# Patient Record
Sex: FEMALE | Race: BLACK OR AFRICAN AMERICAN | NOT HISPANIC OR LATINO | Employment: OTHER | URBAN - METROPOLITAN AREA
[De-identification: names, ages, dates, MRNs, and addresses within clinical notes are randomized per-mention and may not be internally consistent; named-entity substitution may affect disease eponyms.]

---

## 2024-01-24 ENCOUNTER — TELEPHONE (OUTPATIENT)
Dept: INTERNAL MEDICINE | Facility: CLINIC | Age: 84
End: 2024-01-24
Payer: MEDICARE

## 2024-01-24 NOTE — TELEPHONE ENCOUNTER
Attempted to contact pt w/ na. Left vm confirming appt. Advised pt to call if unable to attend appt or with any concerns.

## 2024-01-24 NOTE — TELEPHONE ENCOUNTER
----- Message from Adis Espitia MA sent at 1/24/2024  8:36 AM CST -----  Regarding: PV 2/7/24 @ 3:00 Dr. Stokes  1. Are there any outstanding tasks in the patient's chart? Yes, fasting labs    2. Is there any documentation in the chart? No    3.Has patient been seen in an ER, Urgent care clinic, or been admitted since last visit?  If yes, When, where, and why    4. Has patient seen any other healthcare providers since last visit?  If yes, when, where, and why    5. Has patient had any bloodwork or XR done since last visit?    6. Is patient signed up for patient portal?

## 2024-01-29 ENCOUNTER — LAB VISIT (OUTPATIENT)
Dept: LAB | Facility: HOSPITAL | Age: 84
End: 2024-01-29
Attending: INTERNAL MEDICINE
Payer: MEDICARE

## 2024-01-29 DIAGNOSIS — I10 PRIMARY HYPERTENSION: ICD-10-CM

## 2024-01-29 DIAGNOSIS — E78.00 HYPERCHOLESTEROLEMIA: ICD-10-CM

## 2024-01-29 LAB
ALBUMIN SERPL-MCNC: 3.8 G/DL (ref 3.4–4.8)
ALBUMIN/GLOB SERPL: 1.3 RATIO (ref 1.1–2)
ALP SERPL-CCNC: 85 UNIT/L (ref 40–150)
ALT SERPL-CCNC: 9 UNIT/L (ref 0–55)
APPEARANCE UR: CLEAR
AST SERPL-CCNC: 15 UNIT/L (ref 5–34)
BACTERIA #/AREA URNS AUTO: ABNORMAL /HPF
BILIRUB SERPL-MCNC: 0.7 MG/DL
BILIRUB UR QL STRIP.AUTO: NEGATIVE
BUN SERPL-MCNC: 23.6 MG/DL (ref 9.8–20.1)
CALCIUM SERPL-MCNC: 9.5 MG/DL (ref 8.4–10.2)
CHLORIDE SERPL-SCNC: 109 MMOL/L (ref 98–107)
CHOLEST SERPL-MCNC: 221 MG/DL
CHOLEST/HDLC SERPL: 3 {RATIO} (ref 0–5)
CO2 SERPL-SCNC: 26 MMOL/L (ref 23–31)
COLOR UR AUTO: ABNORMAL
CREAT SERPL-MCNC: 0.71 MG/DL (ref 0.55–1.02)
GFR SERPLBLD CREATININE-BSD FMLA CKD-EPI: >60 MLS/MIN/1.73/M2
GLOBULIN SER-MCNC: 2.9 GM/DL (ref 2.4–3.5)
GLUCOSE SERPL-MCNC: 98 MG/DL (ref 82–115)
GLUCOSE UR QL STRIP.AUTO: NORMAL
HDLC SERPL-MCNC: 83 MG/DL (ref 35–60)
KETONES UR QL STRIP.AUTO: NEGATIVE
LDLC SERPL CALC-MCNC: 111 MG/DL (ref 50–140)
LEUKOCYTE ESTERASE UR QL STRIP.AUTO: NEGATIVE
MUCOUS THREADS URNS QL MICRO: ABNORMAL /LPF
NITRITE UR QL STRIP.AUTO: ABNORMAL
PH UR STRIP.AUTO: 6.5 [PH]
POTASSIUM SERPL-SCNC: 3.9 MMOL/L (ref 3.5–5.1)
PROT SERPL-MCNC: 6.7 GM/DL (ref 5.8–7.6)
PROT UR QL STRIP.AUTO: ABNORMAL
RBC #/AREA URNS AUTO: ABNORMAL /HPF
RBC UR QL AUTO: ABNORMAL
SODIUM SERPL-SCNC: 142 MMOL/L (ref 136–145)
SP GR UR STRIP.AUTO: 1.01 (ref 1–1.03)
SQUAMOUS #/AREA URNS LPF: ABNORMAL /HPF
TRIGL SERPL-MCNC: 133 MG/DL (ref 37–140)
UROBILINOGEN UR STRIP-ACNC: NORMAL
VLDLC SERPL CALC-MCNC: 27 MG/DL
WBC #/AREA URNS AUTO: ABNORMAL /HPF

## 2024-01-29 PROCEDURE — 80061 LIPID PANEL: CPT

## 2024-01-29 PROCEDURE — 80053 COMPREHEN METABOLIC PANEL: CPT

## 2024-01-29 PROCEDURE — 36415 COLL VENOUS BLD VENIPUNCTURE: CPT

## 2024-01-29 PROCEDURE — 81001 URINALYSIS AUTO W/SCOPE: CPT

## 2024-02-07 ENCOUNTER — OFFICE VISIT (OUTPATIENT)
Dept: INTERNAL MEDICINE | Facility: CLINIC | Age: 84
End: 2024-02-07
Payer: MEDICARE

## 2024-02-07 VITALS
WEIGHT: 140 LBS | HEIGHT: 60 IN | DIASTOLIC BLOOD PRESSURE: 78 MMHG | TEMPERATURE: 98 F | SYSTOLIC BLOOD PRESSURE: 146 MMHG | BODY MASS INDEX: 27.48 KG/M2 | OXYGEN SATURATION: 97 % | HEART RATE: 84 BPM | RESPIRATION RATE: 16 BRPM

## 2024-02-07 DIAGNOSIS — C90.00 MULTIPLE MYELOMA, REMISSION STATUS UNSPECIFIED: ICD-10-CM

## 2024-02-07 DIAGNOSIS — G89.29 CHRONIC PAIN OF BOTH KNEES: Primary | ICD-10-CM

## 2024-02-07 DIAGNOSIS — M25.561 CHRONIC PAIN OF BOTH KNEES: Primary | ICD-10-CM

## 2024-02-07 DIAGNOSIS — Z23 NEED FOR VACCINATION: ICD-10-CM

## 2024-02-07 DIAGNOSIS — M25.562 CHRONIC PAIN OF BOTH KNEES: Primary | ICD-10-CM

## 2024-02-07 DIAGNOSIS — E78.00 HYPERCHOLESTEROLEMIA: ICD-10-CM

## 2024-02-07 DIAGNOSIS — I10 PRIMARY HYPERTENSION: ICD-10-CM

## 2024-02-07 DIAGNOSIS — R01.1 MURMUR: ICD-10-CM

## 2024-02-07 PROCEDURE — 3077F SYST BP >= 140 MM HG: CPT | Mod: CPTII,,, | Performed by: INTERNAL MEDICINE

## 2024-02-07 PROCEDURE — 99213 OFFICE O/P EST LOW 20 MIN: CPT | Mod: 25,,, | Performed by: INTERNAL MEDICINE

## 2024-02-07 PROCEDURE — 1101F PT FALLS ASSESS-DOCD LE1/YR: CPT | Mod: CPTII,,, | Performed by: INTERNAL MEDICINE

## 2024-02-07 PROCEDURE — G0008 ADMIN INFLUENZA VIRUS VAC: HCPCS | Mod: ,,, | Performed by: INTERNAL MEDICINE

## 2024-02-07 PROCEDURE — 20610 DRAIN/INJ JOINT/BURSA W/O US: CPT | Mod: 50,,, | Performed by: INTERNAL MEDICINE

## 2024-02-07 PROCEDURE — 3078F DIAST BP <80 MM HG: CPT | Mod: CPTII,,, | Performed by: INTERNAL MEDICINE

## 2024-02-07 PROCEDURE — 1159F MED LIST DOCD IN RCRD: CPT | Mod: CPTII,,, | Performed by: INTERNAL MEDICINE

## 2024-02-07 PROCEDURE — 90694 VACC AIIV4 NO PRSRV 0.5ML IM: CPT | Mod: ,,, | Performed by: INTERNAL MEDICINE

## 2024-02-07 PROCEDURE — 3288F FALL RISK ASSESSMENT DOCD: CPT | Mod: CPTII,,, | Performed by: INTERNAL MEDICINE

## 2024-02-07 PROCEDURE — 1160F RVW MEDS BY RX/DR IN RCRD: CPT | Mod: CPTII,,, | Performed by: INTERNAL MEDICINE

## 2024-02-07 RX ORDER — TRIAMCINOLONE ACETONIDE 40 MG/ML
80 INJECTION, SUSPENSION INTRA-ARTICULAR; INTRAMUSCULAR
Status: COMPLETED | OUTPATIENT
Start: 2024-02-07 | End: 2024-02-07

## 2024-02-07 RX ADMIN — TRIAMCINOLONE ACETONIDE 80 MG: 40 INJECTION, SUSPENSION INTRA-ARTICULAR; INTRAMUSCULAR at 05:02

## 2024-02-07 NOTE — ASSESSMENT & PLAN NOTE
Procedure note:  Patient consented to bilateral knee injections  Informed consent signed by patient and provider, point of needle insertion was marked with surgical marker, cleaned with 3 Betadine swabs.  40 mg of Kenalog and 2 cc of lidocaine were injected into the intra-articular space of both knees using a lateral approach.  Patient tolerated the procedure well with no adverse effect, joint injection aftercare information given.    She declined a referral to ortho today and will see her back in 3 months

## 2024-02-07 NOTE — PROGRESS NOTES
Internal Medicine    Patient ID: [id number]     Chief Complaint: Hypertension (6 month f/u) and Hyperlipidemia (6 month f/u)      HPI:     Anna Marie Campos is a 83 y.o. female here today for a follow up.   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  She had abrupt decompensation for unknown reasons in August 2022 but since then she is had seem to bounce back in those counts look good.  Kidney function is stable  She has bilateral knee osteoarthritis she is ambulating with a cane today but is having difficulty requesting bilateral knee injections we ordered her mammogram last summer she has not done it yet  Labs reviewed everything is stable at current has not had a CBC since last summer    Past Medical History:   Diagnosis Date    Anemia     Hyperlipidemia     Hypertension     OAB (overactive bladder)     Osteoarthritis of both knees     Osteopenia         Past Surgical History:   Procedure Laterality Date    BONE MARROW ASPIRATION N/A 9/28/2022    Procedure: ASPIRATION, BONE MARROW;  Surgeon: Mathieu Pastrana MD;  Location: [facility];  Service: General;  Laterality: N/A;    BREAST BIOPSY  05/01/2019    COLONOSCOPY          Social History     Tobacco Use    Smoking status: Never    Smokeless tobacco: Never   Substance and Sexual Activity    Alcohol use: Yes     Alcohol/week: 1.0 standard drink of alcohol     Types: 1 Shots of liquor per week     Comment: occassionally    Drug use: Never    Sexual activity: Not Currently        Current Outpatient Medications   Medication Instructions    amLODIPine (NORVASC) 10 mg, Oral, Daily    aspirin (ECOTRIN) 81 mg, Oral, Daily    MYRBETRIQ 25 mg, Oral, Daily    rosuvastatin (CRESTOR) 5 mg, Oral, Daily       Review of patient's allergies indicates:   Allergen Reactions    Codeine      Other reaction(s): Unknown    Hydrocodone-acetaminophen Nausea Only        Patient Care Team:  Edson Vail MD as PCP - General (Internal Medicine)  Doc Olivier MD as Consulting  Physician (Orthopedic Surgery)     Subjective:     Review of Systems    12 point review of systems conducted, negative except as stated in the history of present illness. See HPI for details.    Objective:     Visit Vitals  BP (!) 146/78 (BP Location: Left arm, Patient Position: Sitting, BP Method: Medium (Manual))   Pulse 84   Temp 97.8 °F (36.6 °C) (Temporal)   Resp 16   Ht 5' (1.524 m)   Wt 63.5 kg (140 lb)   SpO2 97%   BMI 27.34 kg/m²       Physical Exam  General : Alert and oriented, No acute distress, afebrile.  Eye : PERRLA. EOMI. Normal conjunctiva, Sclerae are nonicteric.  Respiratory : Respirations are non-labored and clear to auscultation bilaterally. Symmetrical air entry bilaterally, no crackles, no wheezes, no rhonchi. No cyanosis, no clubbing.  Cardiovascular : Normal rate, Regular rhythm. 3/6 JEREMIE right second ICS  Gastrointestinal : Soft, nontender, non-distended, bowel sounds are present in all quadrants, no organomegaly, no guarding, no rebound.  Musculoskeletal : Normal range of motion throughout. No muscle tenderness.  Integumentary : Warm, moist, intact.  Neurologic : Alert, Oriented, antalgic gait  Psychiatric : Cooperative, Appropriate mood & affect.   Labs Reviewed:     Chemistry:  Lab Results   Component Value Date     01/29/2024    K 3.9 01/29/2024    CHLORIDE 109 (H) 01/29/2024    BUN 23.6 (H) 01/29/2024    CREATININE 0.71 01/29/2024    EGFRNORACEVR >60 01/29/2024    GLUCOSE 98 01/29/2024    CALCIUM 9.5 01/29/2024    ALKPHOS 85 01/29/2024    LABPROT 6.7 01/29/2024    ALBUMIN 3.8 01/29/2024    BILIDIR 0.2 03/14/2022    IBILI 0.20 03/14/2022    AST 15 01/29/2024    ALT 9 01/29/2024    MG 1.70 08/30/2022    KUCQMUFH21YF 25.6 03/14/2022    TSH 1.3274 03/14/2022        Lab Results   Component Value Date    HGBA1C 5.2 08/25/2022        Hematology:  Lab Results   Component Value Date    WBC 4.51 06/21/2023    HGB 12.3 06/21/2023    HCT 39.9 06/21/2023     06/21/2023       Lipid  Panel:  Lab Results   Component Value Date    CHOL 221 (H) 01/29/2024    HDL 83 (H) 01/29/2024    .00 01/29/2024    TRIG 133 01/29/2024    TOTALCHOLEST 3 01/29/2024        Urine:  Lab Results   Component Value Date    COLORUA Light-Yellow 01/29/2024    APPEARANCEUA Clear 01/29/2024    SGUA 1.013 01/29/2024    PHUA 6.5 01/29/2024    PROTEINUA Trace (A) 01/29/2024    GLUCOSEUA Normal 01/29/2024    KETONESUA Negative 01/29/2024    BLOODUA Trace (A) 01/29/2024    NITRITESUA 2+ (A) 01/29/2024    LEUKOCYTESUR Negative 01/29/2024    RBCUA 0-5 01/29/2024    WBCUA 0-5 01/29/2024    BACTERIA Few (A) 01/29/2024    SQEPUA Trace 01/29/2024    PROTEINURINE 96.3 09/07/2022        Assessment:       ICD-10-CM ICD-9-CM   1. Chronic pain of both knees  M25.561 719.46    M25.562 338.29    G89.29    2. Need for vaccination  Z23 V05.9   3. Murmur  R01.1 785.2   4. Multiple myeloma, remission status unspecified  C90.00 203.00   5. Primary hypertension  I10 401.9   6. Hypercholesterolemia  E78.00 272.0        Plan:     1. Chronic pain of both knees  Assessment & Plan:  Procedure note:  Patient consented to bilateral knee injections  Informed consent signed by patient and provider, point of needle insertion was marked with surgical marker, cleaned with 3 Betadine swabs.  40 mg of Kenalog and 2 cc of lidocaine were injected into the intra-articular space of both knees using a lateral approach.  Patient tolerated the procedure well with no adverse effect, joint injection aftercare information given.    She declined a referral to ortho today and will see her back in 3 months      2. Need for vaccination  -     Influenza - Quadrivalent (Adjuvanted)    3. Murmur  -     Echo Saline Bubble? No; Future    4. Multiple myeloma, remission status unspecified  Assessment & Plan:  Does not appear that patient followed up with her oncologist last year she was supposed to follow-up 3 months after her last appointment so will go ahead and send a  progress note to her oncologist and get her scheduled.      5. Primary hypertension  Assessment & Plan:  Well controlled.   Low Sodium Diet (DASH Diet - Less than 2 grams of sodium per day).  Monitor blood pressure daily and log. Report consistent numbers greater than 140/90.  Maintain healthy weight with goal BMI <30. Exercise 30 minutes per day, 5 days per week.  Smoking cessation encouraged to aid in BP reduction.      6. Hypercholesterolemia  Assessment & Plan:  Lab Results   Component Value Date    .00 01/29/2024    TRIG 133 01/29/2024    HDL 83 (H) 01/29/2024    TOTALCHOLEST 3 01/29/2024       Stressed importance of dietary modifications. Follow a low cholesterol, low saturated fat diet with less that 200mg of cholesterol a day.  Avoid fried foods and high saturated fats (high saturated fats less than 7% of calories).  Add Flax Seed/Fish Oil supplements to diet. Increase dietary fiber.  Regular exercise can reduce LDL and raise HDL. Stressed importance of physical activity 5 times per week for 30 minutes per day.       Other orders  -     triamcinolone acetonide injection 80 mg         Follow up in about 3 months (around 5/7/2024) for with labs prior to visitjeanine. In addition to their scheduled follow up, the patient has also been instructed to follow up on as needed basis.     Future Appointments   Date Time Provider Department Center   5/8/2024  3:20 PM Edson Vail MD [facility] [id number]        Edson Vail MD

## 2024-02-07 NOTE — ASSESSMENT & PLAN NOTE
Well controlled.   Low Sodium Diet (DASH Diet - Less than 2 grams of sodium per day).  Monitor blood pressure daily and log. Report consistent numbers greater than 140/90.  Maintain healthy weight with goal BMI <30. Exercise 30 minutes per day, 5 days per week.  Smoking cessation encouraged to aid in BP reduction.

## 2024-02-07 NOTE — ASSESSMENT & PLAN NOTE
Lab Results   Component Value Date    .00 01/29/2024    TRIG 133 01/29/2024    HDL 83 (H) 01/29/2024    TOTALCHOLEST 3 01/29/2024       Stressed importance of dietary modifications. Follow a low cholesterol, low saturated fat diet with less that 200mg of cholesterol a day.  Avoid fried foods and high saturated fats (high saturated fats less than 7% of calories).  Add Flax Seed/Fish Oil supplements to diet. Increase dietary fiber.  Regular exercise can reduce LDL and raise HDL. Stressed importance of physical activity 5 times per week for 30 minutes per day.

## 2024-02-07 NOTE — ASSESSMENT & PLAN NOTE
Does not appear that patient followed up with her oncologist last year she was supposed to follow-up 3 months after her last appointment so will go ahead and send a progress note to her oncologist and get her scheduled.

## 2024-02-08 ENCOUNTER — TELEPHONE (OUTPATIENT)
Dept: HEMATOLOGY/ONCOLOGY | Facility: CLINIC | Age: 84
End: 2024-02-08
Payer: MEDICARE

## 2024-02-16 DIAGNOSIS — D47.2 SMOLDERING MULTIPLE MYELOMA: ICD-10-CM

## 2024-02-16 DIAGNOSIS — C90.00 MULTIPLE MYELOMA, REMISSION STATUS UNSPECIFIED: Primary | ICD-10-CM

## 2024-02-22 ENCOUNTER — LAB VISIT (OUTPATIENT)
Dept: LAB | Facility: HOSPITAL | Age: 84
End: 2024-02-22
Attending: STUDENT IN AN ORGANIZED HEALTH CARE EDUCATION/TRAINING PROGRAM
Payer: MEDICARE

## 2024-02-22 DIAGNOSIS — D47.2 SMOLDERING MULTIPLE MYELOMA: ICD-10-CM

## 2024-02-22 DIAGNOSIS — C90.00 MULTIPLE MYELOMA, REMISSION STATUS UNSPECIFIED: ICD-10-CM

## 2024-02-22 LAB
ALBUMIN SERPL-MCNC: 4.1 G/DL (ref 3.4–4.8)
ALBUMIN/GLOB SERPL: 1.3 RATIO (ref 1.1–2)
ALP SERPL-CCNC: 82 UNIT/L (ref 40–150)
ALT SERPL-CCNC: 14 UNIT/L (ref 0–55)
AST SERPL-CCNC: 17 UNIT/L (ref 5–34)
BASOPHILS # BLD AUTO: 0.02 X10(3)/MCL
BASOPHILS NFR BLD AUTO: 0.4 %
BILIRUB SERPL-MCNC: 0.7 MG/DL
BUN SERPL-MCNC: 28.7 MG/DL (ref 9.8–20.1)
CALCIUM SERPL-MCNC: 9.9 MG/DL (ref 8.4–10.2)
CHLORIDE SERPL-SCNC: 103 MMOL/L (ref 98–107)
CO2 SERPL-SCNC: 27 MMOL/L (ref 23–31)
CREAT SERPL-MCNC: 0.82 MG/DL (ref 0.55–1.02)
EOSINOPHIL # BLD AUTO: 0.05 X10(3)/MCL (ref 0–0.9)
EOSINOPHIL NFR BLD AUTO: 1 %
ERYTHROCYTE [DISTWIDTH] IN BLOOD BY AUTOMATED COUNT: 13.4 % (ref 11.5–17)
GFR SERPLBLD CREATININE-BSD FMLA CKD-EPI: >60 MLS/MIN/1.73/M2
GLOBULIN SER-MCNC: 3.2 GM/DL (ref 2.4–3.5)
GLUCOSE SERPL-MCNC: 74 MG/DL (ref 82–115)
HCT VFR BLD AUTO: 41.1 % (ref 37–47)
HGB BLD-MCNC: 13.2 G/DL (ref 12–16)
IGA SERPL-MCNC: 258 MG/DL (ref 69–517)
IGG SERPL-MCNC: 1033 MG/DL (ref 522–1631)
IGM SERPL-MCNC: 14 MG/DL (ref 33–293)
IMM GRANULOCYTES # BLD AUTO: 0 X10(3)/MCL (ref 0–0.04)
IMM GRANULOCYTES NFR BLD AUTO: 0 %
LYMPHOCYTES # BLD AUTO: 1.84 X10(3)/MCL (ref 0.6–4.6)
LYMPHOCYTES NFR BLD AUTO: 37.2 %
MCH RBC QN AUTO: 32 PG (ref 27–31)
MCHC RBC AUTO-ENTMCNC: 32.1 G/DL (ref 33–36)
MCV RBC AUTO: 99.5 FL (ref 80–94)
MONOCYTES # BLD AUTO: 0.38 X10(3)/MCL (ref 0.1–1.3)
MONOCYTES NFR BLD AUTO: 7.7 %
NEUTROPHILS # BLD AUTO: 2.65 X10(3)/MCL (ref 2.1–9.2)
NEUTROPHILS NFR BLD AUTO: 53.7 %
PLATELET # BLD AUTO: 338 X10(3)/MCL (ref 130–400)
PMV BLD AUTO: 9.5 FL (ref 7.4–10.4)
POTASSIUM SERPL-SCNC: 4.8 MMOL/L (ref 3.5–5.1)
PROT SERPL-MCNC: 7.3 GM/DL (ref 5.8–7.6)
RBC # BLD AUTO: 4.13 X10(6)/MCL (ref 4.2–5.4)
SODIUM SERPL-SCNC: 138 MMOL/L (ref 136–145)
WBC # SPEC AUTO: 4.94 X10(3)/MCL (ref 4.5–11.5)

## 2024-02-22 PROCEDURE — 80053 COMPREHEN METABOLIC PANEL: CPT

## 2024-02-22 PROCEDURE — 85025 COMPLETE CBC W/AUTO DIFF WBC: CPT

## 2024-02-22 PROCEDURE — 84165 PROTEIN E-PHORESIS SERUM: CPT

## 2024-02-22 PROCEDURE — 83521 IG LIGHT CHAINS FREE EACH: CPT | Mod: 59

## 2024-02-22 PROCEDURE — 82784 ASSAY IGA/IGD/IGG/IGM EACH: CPT

## 2024-02-22 PROCEDURE — 36415 COLL VENOUS BLD VENIPUNCTURE: CPT

## 2024-02-23 ENCOUNTER — HOSPITAL ENCOUNTER (OUTPATIENT)
Dept: CARDIOLOGY | Facility: HOSPITAL | Age: 84
Discharge: HOME | End: 2024-02-23
Attending: INTERNAL MEDICINE
Payer: MEDICARE

## 2024-02-23 DIAGNOSIS — R01.1 MURMUR: ICD-10-CM

## 2024-02-23 LAB
ALBUMIN % SPEP (OHS): 50.88
ALBUMIN SERPL-MCNC: 3.8 G/DL (ref 3.4–4.8)
ALBUMIN/GLOB SERPL: 1.1 RATIO (ref 1.1–2)
ALPHA 1 GLOB (OHS): 0.28 GM/DL
ALPHA 1 GLOB% (OHS): 3.8
ALPHA 2 GLOB % (OHS): 10.4
ALPHA 2 GLOB (OHS): 0.77 GM/DL
BETA GLOB (OHS): 1.48 GM/DL
BETA GLOB% (OHS): 19.98
GAMMA GLOBULIN % (OHS): 14.94
GAMMA GLOBULIN (OHS): 1.11 GM/DL
GLOBULIN SER-MCNC: 3.6 GM/DL (ref 2.4–3.5)
KAPPA LC FREE SER-MCNC: 1.23 MG/DL (ref 0.33–1.94)
KAPPA LC FREE/LAMBDA FREE SER: 0.01 {RATIO} (ref 0.26–1.65)
LAMBDA LC FREE SERPL-MCNC: 194 MG/DL (ref 0.57–2.63)
M SPIKE % (OHS): 2.36
M SPIKE (OHS): 0.17 GM/DL
PATH REV: NORMAL
PROT SERPL-MCNC: 7.4 GM/DL (ref 5.8–7.6)

## 2024-02-23 PROCEDURE — 93306 TTE W/DOPPLER COMPLETE: CPT | Mod: 26,,, | Performed by: INTERNAL MEDICINE

## 2024-02-23 PROCEDURE — 93306 TTE W/DOPPLER COMPLETE: CPT

## 2024-02-24 LAB
AV INDEX (PROSTH): 0.48
AV MEAN GRADIENT: 10 MMHG
AV PEAK GRADIENT: 19 MMHG
AV VALVE AREA BY VELOCITY RATIO: 1.22 CM²
AV VALVE AREA: 1.5 CM²
AV VELOCITY RATIO: 0.39
CV ECHO LV RWT: 0.68 CM
DOP CALC AO PEAK VEL: 2.18 M/S
DOP CALC AO VTI: 37 CM
DOP CALC LVOT AREA: 3.1 CM2
DOP CALC LVOT DIAMETER: 2 CM
DOP CALC LVOT PEAK VEL: 0.85 M/S
DOP CALC LVOT STROKE VOLUME: 55.58 CM3
DOP CALCLVOT PEAK VEL VTI: 17.7 CM
E WAVE DECELERATION TIME: 256 MSEC
E/A RATIO: 0.57
E/E' RATIO: 6.12 M/S
ECHO LV POSTERIOR WALL: 1.37 CM (ref 0.6–1.1)
FRACTIONAL SHORTENING: 32 % (ref 28–44)
INTERVENTRICULAR SEPTUM: 1.11 CM (ref 0.6–1.1)
LEFT ATRIUM SIZE: 3.2 CM
LEFT ATRIUM VOLUME MOD: 43.3 CM3
LEFT INTERNAL DIMENSION IN SYSTOLE: 2.73 CM (ref 2.1–4)
LEFT VENTRICLE DIASTOLIC VOLUME: 70.4 ML
LEFT VENTRICLE SYSTOLIC VOLUME: 27.8 ML
LEFT VENTRICULAR INTERNAL DIMENSION IN DIASTOLE: 4.01 CM (ref 3.5–6)
LEFT VENTRICULAR MASS: 174.39 G
LV LATERAL E/E' RATIO: 4 M/S
LV SEPTAL E/E' RATIO: 13 M/S
LVOT MG: 2 MMHG
LVOT MV: 0.59 CM/S
MV PEAK A VEL: 0.92 M/S
MV PEAK E VEL: 0.52 M/S
OHS LV EJECTION FRACTION SIMPSONS BIPLANE MOD: 67 %
PISA TR MAX VEL: 2.32 M/S
PV PEAK GRADIENT: 7 MMHG
PV PEAK VELOCITY: 1.28 M/S
RA PRESSURE ESTIMATED: 3 MMHG
RV TB RVSP: 5 MMHG
TDI LATERAL: 0.13 M/S
TDI SEPTAL: 0.04 M/S
TDI: 0.09 M/S
TR MAX PG: 22 MMHG
TRICUSPID ANNULAR PLANE SYSTOLIC EXCURSION: 1.61 CM
TV REST PULMONARY ARTERY PRESSURE: 25 MMHG

## 2024-02-26 ENCOUNTER — OFFICE VISIT (OUTPATIENT)
Dept: HEMATOLOGY/ONCOLOGY | Facility: CLINIC | Age: 84
End: 2024-02-26
Payer: MEDICARE

## 2024-02-26 ENCOUNTER — TELEPHONE (OUTPATIENT)
Dept: INTERNAL MEDICINE | Facility: CLINIC | Age: 84
End: 2024-02-26
Payer: MEDICARE

## 2024-02-26 VITALS
TEMPERATURE: 98 F | WEIGHT: 137.38 LBS | DIASTOLIC BLOOD PRESSURE: 98 MMHG | BODY MASS INDEX: 26.97 KG/M2 | OXYGEN SATURATION: 99 % | HEIGHT: 60 IN | HEART RATE: 77 BPM | SYSTOLIC BLOOD PRESSURE: 179 MMHG | RESPIRATION RATE: 16 BRPM

## 2024-02-26 DIAGNOSIS — I10 PRIMARY HYPERTENSION: Primary | ICD-10-CM

## 2024-02-26 DIAGNOSIS — D47.2 SMOLDERING MULTIPLE MYELOMA: Primary | ICD-10-CM

## 2024-02-26 DIAGNOSIS — M85.80 OSTEOPENIA, UNSPECIFIED LOCATION: ICD-10-CM

## 2024-02-26 PROCEDURE — 99215 OFFICE O/P EST HI 40 MIN: CPT | Mod: S$GLB,,,

## 2024-02-26 PROCEDURE — 99999 PR PBB SHADOW E&M-EST. PATIENT-LVL III: CPT | Mod: PBBFAC,,,

## 2024-02-26 PROCEDURE — 3080F DIAST BP >= 90 MM HG: CPT | Mod: CPTII,S$GLB,,

## 2024-02-26 PROCEDURE — 1160F RVW MEDS BY RX/DR IN RCRD: CPT | Mod: CPTII,S$GLB,,

## 2024-02-26 PROCEDURE — 1126F AMNT PAIN NOTED NONE PRSNT: CPT | Mod: CPTII,S$GLB,,

## 2024-02-26 PROCEDURE — 1159F MED LIST DOCD IN RCRD: CPT | Mod: CPTII,S$GLB,,

## 2024-02-26 PROCEDURE — 3077F SYST BP >= 140 MM HG: CPT | Mod: CPTII,S$GLB,,

## 2024-02-26 NOTE — TELEPHONE ENCOUNTER
----- Message from Edson Vail MD sent at 2/26/2024  8:11 AM CST -----  Echocardiogram reviewed patient with mild aortic stenosis which means some narrowing of the valve area also evidence of grade 1 diastolic dysfunction maintenance the patient has suffering with hypertension for some time heart is trying to push against more resistance.  We need to ensure better control of blood pressure  Currently on amlodipine 10 mg, add losartan 25.  Blood pressure log in 1 week  Referral to cards if not established

## 2024-02-26 NOTE — PROGRESS NOTES
Echocardiogram reviewed patient with mild aortic stenosis which means some narrowing of the valve area also evidence of grade 1 diastolic dysfunction maintenance the patient has suffering with hypertension for some time heart is trying to push against more resistance.  We need to ensure better control of blood pressure  Currently on amlodipine 10 mg, add losartan 25.  Blood pressure log in 1 week  Referral to cards if not established

## 2024-02-26 NOTE — PROGRESS NOTES
Subjective:       Patient ID: Anna Marie Campos is a 83 y.o. female.    Chief Complaint: Follow Up    Diagnosis: Multiple Myeloma - Smoldering    Current Treatment: 1. Active Observation    Treatment History: N/A    HPI:   81yo who was found by PCP in late August to have an CHRISS with UTI, hyperkalemia, and progressive anemia. Therapy and further workup lead to SPEP with IgA M-spike of 0.12 g/dl with lambda light chain restriction. Lab review with a normocytic anemia with Hgb/Hct of 9.6/28.9 and thrombocythemia of 417K. Renal function stable, no evidence of hyperCa. Of note, patients abnormal CBC with Macrocytic anemia and thrombocythemia is new since August '22, as her values were normal in May CBC prior (except for macrocytosis but no anemia). A workup was initiated to evaluate for multiple myeloma - 9/2022 October 2022 Bone Scan with no lytic lesions. Plans to repeat     Interval History  Patient here today for 3 month NP follow up appointment and labs to discuss results and treatment plan.   Overall she feels well.   She presents to the clinic today for a follow-up.   Labs last week with no evidence of MM disease progression or CRAB criteria.   Denies any fever,chills, NS or new pains.   Continues to cook. Appetite and Energy is stable.   Otherwise no major issues or concerns      Past Medical History:   Diagnosis Date    Anemia     Hyperlipidemia     Hypertension     OAB (overactive bladder)     Osteoarthritis of both knees     Osteopenia       Past Surgical History:   Procedure Laterality Date    BONE MARROW ASPIRATION N/A 9/28/2022    Procedure: ASPIRATION, BONE MARROW;  Surgeon: Mathieu Pastrana MD;  Location: [facility];  Service: General;  Laterality: N/A;    BREAST BIOPSY  05/01/2019    COLONOSCOPY       Social History     Socioeconomic History    Marital status:    Tobacco Use    Smoking status: Never    Smokeless tobacco: Never   Substance and Sexual Activity    Alcohol use: Yes     Alcohol/week: 1.0  standard drink of alcohol     Types: 1 Shots of liquor per week     Comment: occassionally    Drug use: Never    Sexual activity: Not Currently     Social Determinants of Health     Financial Resource Strain: Low Risk  (6/20/2023)    Overall Financial Resource Strain (CARDIA)     Difficulty of Paying Living Expenses: Not hard at all   Food Insecurity: No Food Insecurity (6/20/2023)    Hunger Vital Sign     Worried About Running Out of Food in the Last Year: Never true     Ran Out of Food in the Last Year: Never true   Transportation Needs: No Transportation Needs (6/20/2023)    PRAPARE - Transportation     Lack of Transportation (Medical): No     Lack of Transportation (Non-Medical): No   Physical Activity: Insufficiently Active (6/20/2023)    Exercise Vital Sign     Days of Exercise per Week: 3 days     Minutes of Exercise per Session: 40 min   Stress: No Stress Concern Present (6/20/2023)    [employer] - Occupational Stress Questionnaire     Feeling of Stress : Not at all   Social Connections: Moderately Isolated (6/20/2023)    Social Connection and Isolation Panel [NHANES]     Frequency of Communication with Friends and Family: More than three times a week     Frequency of Social Gatherings with Friends and Family: More than three times a week     Attends Mu-ism Services: 1 to 4 times per year     Active Member of Clubs or Organizations: No     Attends Club or Organization Meetings: Never     Marital Status:    Housing Stability: Low Risk  (2/7/2024)    Housing Stability Vital Sign     Unable to Pay for Housing in the Last Year: No     Number of Places Lived in the Last Year: 1     Unstable Housing in the Last Year: No      Family History   Problem Relation Age of Onset    Hypertension Mother     Hypertension Father     Stroke Father     Breast cancer Sister     Anemia Sister     Lung cancer Brother     Bone cancer Brother     Diabetes Daughter     Diabetes Son       Review  of patient's allergies indicates:   Allergen Reactions    Codeine      Other reaction(s): Unknown    Hydrocodone-acetaminophen Nausea Only      Review of Systems   Constitutional:  Negative for activity change, appetite change, fatigue and fever.   HENT:  Negative for sore throat.    Eyes:  Negative for visual disturbance.   Respiratory:  Negative for shortness of breath.    Cardiovascular:  Negative for chest pain.   Gastrointestinal:  Negative for abdominal distention and abdominal pain.   Endocrine: Negative for polydipsia.   Genitourinary:  Negative for dysuria and hot flashes.   Musculoskeletal:  Negative for arthralgias and back pain.   Integumentary:  Negative for rash.   Neurological:  Negative for dizziness and light-headedness.   Hematological:  Does not bruise/bleed easily.   Psychiatric/Behavioral:  Negative for confusion.          Objective:      Vitals:    02/26/24 1455   BP: (!) 179/98   Pulse: 77   Resp: 16   Temp: 97.7 °F (36.5 °C)       Physical Exam  Constitutional:       Appearance: Normal appearance. She is normal weight.   HENT:      Head: Normocephalic and atraumatic.      Nose: Nose normal.      Mouth/Throat:      Mouth: Mucous membranes are moist.      Pharynx: Oropharynx is clear.   Eyes:      Extraocular Movements: Extraocular movements intact.      Conjunctiva/sclera: Conjunctivae normal.      Pupils: Pupils are equal, round, and reactive to light.   Cardiovascular:      Rate and Rhythm: Normal rate and regular rhythm.      Pulses: Normal pulses.      Heart sounds: Normal heart sounds.   Pulmonary:      Effort: Pulmonary effort is normal.      Breath sounds: Normal breath sounds.   Abdominal:      General: There is no distension.      Palpations: Abdomen is soft.      Tenderness: There is no abdominal tenderness.   Musculoskeletal:         General: No tenderness or deformity.      Cervical back: Normal range of motion and neck supple.      Right lower leg: No edema.      Left lower leg:  No edema.   Lymphadenopathy:      Cervical: No cervical adenopathy.   Skin:     General: Skin is warm and dry.   Neurological:      General: No focal deficit present.      Mental Status: She is alert.       LABS AND IMAGING REVIEWED IN EPIC  Component      Latest Ref Rng 2/22/2024   IgG      522.00 - 1,631.00 mg/dL 1,033.00    IgA      69.0 - 517.0 mg/dL 258.0    IgM      33.0 - 293.0 mg/dL 14.0 (L)       Legend:  (L) Low      Component      Latest Ref Rng 2/22/2024   [place] Free Light Chain      0.3300 - 1.94 mg/dL 1.23    Lambda Free Light Chain      0.5700 - 2.63 mg/dL 194 (H)    Kappa/Lambda FLC Ratio      0.2600 - 1.65  0.0063 (L)       Legend:  (H) High  (L) Low            10/6/22 XRAY Bone Scan  Impression:  1. No discrete lytic skeletal lesion identified.  2. Extensive degenerative osteoarthritic changes described above.    9/28/22 Bone Marrow Biopsy  Final Diagnosis   BONE MARROW, RIGHT POSTERIOR ILIAC CREST, BIOPSY:     PLASMA CELL MYELOMA.     LAMBDA RESTRICTED PLASMA CELLS COMPRISE 20% OF MARROW NUCLEATED CELLULARITY.       DECREASED MARROW IRON STORES.   24 Hour Urine UPEP  Component      Latest Ref Rng & Units 9/23/2022   Total Protein, 24 HR, U      <229 mg/24 h 966 (H)   Urine Volume      mL 2300   Albumin      mg/24 h 96.6   Alpha-1 globulin      mg/24 h 9.7   Alpha-2 globulin      mg/24 h 67.6   Beta globulin      mg/24 h 763.1   Gamma globulin      mg/24 h 29.0   A/G Ratio       0.11   M spike      mg/24 h 691 (H)     Assessment:   Light Chain Multiple Myeloma - Smoldering  She meets criteria for diagnosis based off bone marrow with 20% plasma cells as well as UPEP with >500 g M-spike. She has no evidence of CRAB criteria based off CBC, CMP, and 10/2022 skeletal survey. She is currently under the smoldering myeloma category.. 11/2022 Dexa with evidence of osteopenia. I will continue to watch her closely with labs and symptom checks. I instructed her and her son on the types of general symptoms I  want her to look out for and notify me if she begins to have them. If she eventually does progress and require therapy would consider single agent Daratuzumab given her age.       Plan:         - Continue active observation for her multiple myeloma at this time.  - Follow up with PCP for bp management  - Bone scan prior to next visit; ordered today  - RTC 3 months for MD visit, labs 1 week prior    I spent a total of 40 minutes on the day of the visit.This includes face to face time and non-face to face time preparing to see the patient (eg, review of tests), obtaining and/or reviewing separately obtained history, documenting clinical information in the electronic or other health record, independently interpreting results and communicating results to the patient/family/caregiver, or care coordinator.      MIRNA Glover  Hematology/Oncology  [facility]

## 2024-02-28 ENCOUNTER — TELEPHONE (OUTPATIENT)
Dept: HEMATOLOGY/ONCOLOGY | Facility: CLINIC | Age: 84
End: 2024-02-28
Payer: MEDICARE

## 2024-02-28 ENCOUNTER — TELEPHONE (OUTPATIENT)
Dept: INTERNAL MEDICINE | Facility: CLINIC | Age: 84
End: 2024-02-28
Payer: MEDICARE

## 2024-02-28 DIAGNOSIS — I10 PRIMARY HYPERTENSION: Primary | ICD-10-CM

## 2024-02-28 RX ORDER — LOSARTAN POTASSIUM 25 MG/1
25 TABLET ORAL DAILY
Qty: 30 TABLET | Refills: 3 | Status: SHIPPED | OUTPATIENT
Start: 2024-02-28 | End: 2024-04-18 | Stop reason: SDUPTHER

## 2024-02-28 NOTE — TELEPHONE ENCOUNTER
----- Message from Amaury Seymour sent at 2/28/2024 11:02 AM CST -----  .Type:  Needs Medical Advice    Who Called: pt's son Remi Campos  Symptoms (please be specific):    How long has patient had these symptoms:    Pharmacy name and phone #:    Would the patient rather a call back or a response via [facility]? Call back  Best Call Back Number: [phone]  Additional Information: calling to check on status of medication dosage change for blood pressure medication.

## 2024-02-28 NOTE — TELEPHONE ENCOUNTER
----- Message from Amaury Seymour sent at 2/28/2024 11:02 AM CST -----  .Type:  Needs Medical Advice    Who Called: pt's son Remi Campos  Symptoms (please be specific):    How long has patient had these symptoms:    Pharmacy name and phone #:    Would the patient rather a call back or a response via [facility]? Call back  Best Call Back Number: [phone]  Additional Information: calling to check on status of medication dosage change for blood pressure medication.

## 2024-04-18 DIAGNOSIS — N32.81 OAB (OVERACTIVE BLADDER): ICD-10-CM

## 2024-04-18 DIAGNOSIS — I10 PRIMARY HYPERTENSION: ICD-10-CM

## 2024-04-18 DIAGNOSIS — E78.00 HYPERCHOLESTEROLEMIA: Primary | ICD-10-CM

## 2024-04-18 RX ORDER — MIRABEGRON 25 MG/1
25 TABLET, FILM COATED, EXTENDED RELEASE ORAL DAILY
Qty: 30 TABLET | Refills: 11 | Status: SHIPPED | OUTPATIENT
Start: 2024-04-18 | End: 2024-04-26 | Stop reason: SDUPTHER

## 2024-04-18 RX ORDER — LOSARTAN POTASSIUM 25 MG/1
25 TABLET ORAL DAILY
Qty: 30 TABLET | Refills: 11 | Status: SHIPPED | OUTPATIENT
Start: 2024-04-18 | End: 2024-05-08 | Stop reason: SDUPTHER

## 2024-04-18 RX ORDER — ROSUVASTATIN CALCIUM 5 MG/1
5 TABLET, COATED ORAL DAILY
Qty: 30 TABLET | Refills: 11 | Status: SHIPPED | OUTPATIENT
Start: 2024-04-18

## 2024-04-18 RX ORDER — AMLODIPINE BESYLATE 10 MG/1
10 TABLET ORAL DAILY
Qty: 30 TABLET | Refills: 11 | Status: SHIPPED | OUTPATIENT
Start: 2024-04-18

## 2024-04-24 DIAGNOSIS — I10 PRIMARY HYPERTENSION: Primary | ICD-10-CM

## 2024-04-25 ENCOUNTER — TELEPHONE (OUTPATIENT)
Dept: INTERNAL MEDICINE | Facility: CLINIC | Age: 84
End: 2024-04-25
Payer: MEDICARE

## 2024-04-25 NOTE — TELEPHONE ENCOUNTER
----- Message from Adis Espitia MA sent at 4/24/2024 12:16 PM CDT -----  Regarding: PV 5/8/24 @ 3:20 Dr. Stokes  1. Are there any outstanding tasks in the patient's chart? Yes, fasting labs    2. Is there any documentation in the chart? No    3.Has patient been seen in an ER, Urgent care clinic, or been admitted since last visit?  If yes, When, where, and why    4. Has patient seen any other healthcare providers since last visit?  If yes, when, where, and why    5. Has patient had any bloodwork or XR done since last visit?    6. Is patient signed up for patient portal?

## 2024-04-26 ENCOUNTER — TELEPHONE (OUTPATIENT)
Dept: INTERNAL MEDICINE | Facility: CLINIC | Age: 84
End: 2024-04-26
Payer: MEDICARE

## 2024-04-26 DIAGNOSIS — N32.81 OAB (OVERACTIVE BLADDER): ICD-10-CM

## 2024-04-26 RX ORDER — MIRABEGRON 25 MG/1
25 TABLET, FILM COATED, EXTENDED RELEASE ORAL DAILY
Qty: 30 TABLET | Refills: 11 | Status: SHIPPED | OUTPATIENT
Start: 2024-04-26 | End: 2025-04-26

## 2024-04-26 NOTE — TELEPHONE ENCOUNTER
----- Message from Nicole Cabrera sent at 4/26/2024  9:42 AM CDT -----  Regarding: med refill  .Type:  RX Refill Request    Who Called: pharmacy  Refill or New Rx:refill  RX Name and Strength:mirabegron (MYRBETRIQ) 25 mg Tb24 ER tablet  How is the patient currently taking it? (ex. 1XDay):1xday  Is this a 30 day or 90 day RX:30  Preferred Pharmacy with phone number:[facility] - [place], [place] - [address] [[id number]]  Local or Mail Order:mail  Ordering Provider:Kike  Would the patient rather a call back or a response via [facility]? Call back  Best Call Back Number:[phone]  Additional Information: pt needs authorization from  to refill med

## 2024-05-08 ENCOUNTER — OFFICE VISIT (OUTPATIENT)
Dept: INTERNAL MEDICINE | Facility: CLINIC | Age: 84
End: 2024-05-08
Payer: MEDICARE

## 2024-05-08 VITALS
SYSTOLIC BLOOD PRESSURE: 138 MMHG | HEIGHT: 60 IN | BODY MASS INDEX: 27.29 KG/M2 | DIASTOLIC BLOOD PRESSURE: 80 MMHG | WEIGHT: 139 LBS | OXYGEN SATURATION: 97 % | HEART RATE: 73 BPM

## 2024-05-08 DIAGNOSIS — E78.00 HYPERCHOLESTEROLEMIA: ICD-10-CM

## 2024-05-08 DIAGNOSIS — I10 PRIMARY HYPERTENSION: ICD-10-CM

## 2024-05-08 DIAGNOSIS — D47.2 SMOLDERING MULTIPLE MYELOMA: Primary | ICD-10-CM

## 2024-05-08 PROCEDURE — 99214 OFFICE O/P EST MOD 30 MIN: CPT | Mod: ,,, | Performed by: NURSE PRACTITIONER

## 2024-05-08 RX ORDER — LOSARTAN POTASSIUM 25 MG/1
25 TABLET ORAL DAILY
Qty: 90 TABLET | Refills: 3 | Status: SHIPPED | OUTPATIENT
Start: 2024-05-08 | End: 2025-05-08

## 2024-05-08 NOTE — ASSESSMENT & PLAN NOTE
Lab Results   Component Value Date    .00 01/29/2024    TRIG 133 01/29/2024    HDL 83 (H) 01/29/2024    TOTALCHOLEST 3 01/29/2024     Hyperlipidemia Medications               rosuvastatin (CRESTOR) 5 MG tablet Take 1 tablet (5 mg total) by mouth once daily.     Stressed importance of dietary modifications. Follow a low cholesterol, low saturated fat diet with less that 200mg of cholesterol a day.  Avoid fried foods and high saturated fats (high saturated fats less than 7% of calories).  Add Flax Seed/Fish Oil supplements to diet. Increase dietary fiber.  Regular exercise can reduce LDL and raise HDL. Stressed importance of physical activity 5 times per week for 30 minutes per day.

## 2024-05-08 NOTE — ASSESSMENT & PLAN NOTE
Has upcoming visit for labs for Hematology/Oncology  Most recent renal function and blood counts look excellent

## 2024-05-08 NOTE — ASSESSMENT & PLAN NOTE
Well controlled.   Hypertension Medications               amLODIPine (NORVASC) 10 MG tablet Take 1 tablet (10 mg total) by mouth once daily.    losartan (COZAAR) 25 MG tablet Take 1 tablet (25 mg total) by mouth once daily.     Low Sodium Diet (DASH Diet - Less than 2 grams of sodium per day).  Monitor blood pressure daily and log. Report consistent numbers greater than 140/90.  Maintain healthy weight with goal BMI <30. Exercise 30 minutes per day, 5 days per week.

## 2024-05-08 NOTE — PROGRESS NOTES
Internal Medicine    Patient ID: [id number]     Chief Complaint: Follow-up (HLD)    HPI:     Anna Marie Campos is a 83 y.o. female here today for a follow up. Plans to repeat labs later this month with oncology - will repeat our labs with oncology labs. Overall she feels well. HTN well controlled - monitors routinely at home. No other complaints today.     Past Medical History:   Diagnosis Date    Anemia     Hyperlipidemia     Hypertension     OAB (overactive bladder)     Osteoarthritis of both knees     Osteopenia         Past Surgical History:   Procedure Laterality Date    BONE MARROW ASPIRATION N/A 9/28/2022    Procedure: ASPIRATION, BONE MARROW;  Surgeon: Mathieu Pastrana MD;  Location: [facility];  Service: General;  Laterality: N/A;    BREAST BIOPSY  05/01/2019    COLONOSCOPY          Social History     Tobacco Use    Smoking status: Never    Smokeless tobacco: Never   Substance and Sexual Activity    Alcohol use: Yes     Alcohol/week: 1.0 standard drink of alcohol     Types: 1 Shots of liquor per week     Comment: occassionally    Drug use: Never    Sexual activity: Not Currently        Current Outpatient Medications   Medication Instructions    amLODIPine (NORVASC) 10 mg, Oral, Daily    aspirin (ECOTRIN) 81 mg, Oral, Daily    losartan (COZAAR) 25 mg, Oral, Daily    MYRBETRIQ 25 mg, Oral, Daily    rosuvastatin (CRESTOR) 5 mg, Oral, Daily       Review of patient's allergies indicates:   Allergen Reactions    Codeine      Other reaction(s): Unknown    Hydrocodone-acetaminophen Nausea Only        Patient Care Team:  Edson Vail MD as PCP - General (Internal Medicine)  Doc Olivier MD as Consulting Physician (Orthopedic Surgery)     Subjective:     Review of Systems    12 point review of systems conducted, negative except as stated in the history of present illness. See HPI for details.    Objective:     Visit Vitals  /80   Pulse 73   Ht 5' (1.524 m)   Wt 63 kg (139 lb)   SpO2 97%   BMI 27.15 kg/m²        Physical Exam  Vitals and nursing note reviewed.   Constitutional:       General: She is not in acute distress.     Appearance: She is not ill-appearing.   HENT:      Head: Normocephalic and atraumatic.      Mouth/Throat:      Mouth: Mucous membranes are moist.      Pharynx: Oropharynx is clear.   Eyes:      General: No scleral icterus.     Extraocular Movements: Extraocular movements intact.      Conjunctiva/sclera: Conjunctivae normal.      Pupils: Pupils are equal, round, and reactive to light.   Cardiovascular:      Rate and Rhythm: Normal rate and regular rhythm.      Heart sounds: No murmur heard.     No friction rub. No gallop.   Pulmonary:      Effort: Pulmonary effort is normal. No respiratory distress.      Breath sounds: Normal breath sounds. No wheezing, rhonchi or rales.   Abdominal:      General: Abdomen is flat. Bowel sounds are normal. There is no distension.      Palpations: Abdomen is soft.      Tenderness: There is no abdominal tenderness.   Musculoskeletal:         General: Normal range of motion.      Cervical back: Normal range of motion and neck supple.   Skin:     General: Skin is warm and dry.   Neurological:      General: No focal deficit present.      Mental Status: She is alert.   Psychiatric:         Mood and Affect: Mood normal.         Labs Reviewed:     Chemistry:  Lab Results   Component Value Date     02/22/2024    K 4.8 02/22/2024    CHLORIDE 103 02/22/2024    BUN 28.7 (H) 02/22/2024    CREATININE 0.82 02/22/2024    EGFRNORACEVR >60 02/22/2024    GLUCOSE 74 (L) 02/22/2024    CALCIUM 9.9 02/22/2024    ALKPHOS 82 02/22/2024    LABPROT 7.3 02/22/2024    LABPROT 7.4 02/22/2024    ALBUMIN 4.1 02/22/2024    ALBUMIN 3.8 02/22/2024    BILIDIR 0.2 03/14/2022    IBILI 0.20 03/14/2022    AST 17 02/22/2024    ALT 14 02/22/2024    MG 1.70 08/30/2022    VMJJRPNQ26VA 25.6 03/14/2022    TSH 1.3274 03/14/2022        Lab Results   Component Value Date    HGBA1C 5.2 08/25/2022         Hematology:  Lab Results   Component Value Date    WBC 4.94 02/22/2024    HGB 13.2 02/22/2024    HCT 41.1 02/22/2024     02/22/2024       Lipid Panel:  Lab Results   Component Value Date    CHOL 221 (H) 01/29/2024    HDL 83 (H) 01/29/2024    .00 01/29/2024    TRIG 133 01/29/2024    TOTALCHOLEST 3 01/29/2024        Urine:  Lab Results   Component Value Date    COLORUA Light-Yellow 01/29/2024    APPEARANCEUA Clear 01/29/2024    SGUA 1.013 01/29/2024    PHUA 6.5 01/29/2024    PROTEINUA Trace (A) 01/29/2024    GLUCOSEUA Normal 01/29/2024    KETONESUA Negative 01/29/2024    BLOODUA Trace (A) 01/29/2024    NITRITESUA 2+ (A) 01/29/2024    LEUKOCYTESUR Negative 01/29/2024    RBCUA 0-5 01/29/2024    WBCUA 0-5 01/29/2024    BACTERIA Few (A) 01/29/2024    SQEPUA Trace 01/29/2024    PROTEINURINE 96.3 09/07/2022        Assessment:       ICD-10-CM ICD-9-CM   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 Primary hypertension  I10 401.9   3. Hypercholesterolemia  E78.00 272.0        Plan:     1. Smoldering multiple myeloma  Assessment & Plan:  Has upcoming visit for labs for Hematology/Oncology  Most recent renal function and blood counts look excellent      2. Primary hypertension  Assessment & Plan:  Well controlled.   Hypertension Medications               amLODIPine (NORVASC) 10 MG tablet Take 1 tablet (10 mg total) by mouth once daily.    losartan (COZAAR) 25 MG tablet Take 1 tablet (25 mg total) by mouth once daily.     Low Sodium Diet (DASH Diet - Less than 2 grams of sodium per day).  Monitor blood pressure daily and log. Report consistent numbers greater than 140/90.  Maintain healthy weight with goal BMI <30. Exercise 30 minutes per day, 5 days per week.    Orders:  -     losartan (COZAAR) 25 MG tablet; Take 1 tablet (25 mg total) by mouth once daily.  Dispense: 90 tablet; Refill: 3    3. Hypercholesterolemia  Assessment & Plan:  Lab Results   Component Value Date    .00 01/29/2024    TRIG 133  01/29/2024    HDL 83 (H) 01/29/2024    TOTALCHOLEST 3 01/29/2024     Hyperlipidemia Medications               rosuvastatin (CRESTOR) 5 MG tablet Take 1 tablet (5 mg total) by mouth once daily.     Stressed importance of dietary modifications. Follow a low cholesterol, low saturated fat diet with less that 200mg of cholesterol a day.  Avoid fried foods and high saturated fats (high saturated fats less than 7% of calories).  Add Flax Seed/Fish Oil supplements to diet. Increase dietary fiber.  Regular exercise can reduce LDL and raise HDL. Stressed importance of physical activity 5 times per week for 30 minutes per day.            Follow up in about 3 months (around 8/8/2024) for Medicare Wellness. In addition to their scheduled follow up, the patient has also been instructed to follow up on as needed basis.     Future Appointments   Date Time Provider Department Center   5/22/2024  8:00 AM [facility] NM3 INJECTION [facility]   5/22/2024 10:30 AM [facility]1 400 LB LIMIT [facility]   6/5/2024  2:40 PM LAB, [facility] LAB [facility]   6/12/2024  1:00 PM [facility]   8/14/2024 10:00 AM Edson Vail MD [facility]        JOSSELIN Mckeon

## 2024-07-31 DIAGNOSIS — E78.00 HYPERCHOLESTEROLEMIA: ICD-10-CM

## 2024-07-31 DIAGNOSIS — E55.9 VITAMIN D DEFICIENCY: ICD-10-CM

## 2024-07-31 DIAGNOSIS — I10 PRIMARY HYPERTENSION: Primary | ICD-10-CM

## 2024-07-31 DIAGNOSIS — E16.1 OTHER HYPOGLYCEMIA: ICD-10-CM

## 2024-08-01 ENCOUNTER — TELEPHONE (OUTPATIENT)
Dept: INTERNAL MEDICINE | Facility: CLINIC | Age: 84
End: 2024-08-01
Payer: MEDICARE

## 2024-08-01 NOTE — TELEPHONE ENCOUNTER
----- Message from Adis Espitia MA sent at 7/31/2024  9:02 AM CDT -----  Regarding: PV 8/14/24 @ 10:00 Dr. Stokes  1. Are there any outstanding tasks in the patient's chart? Yes, fasting labs    2. Is there any documentation in the chart? No    3.Has patient been seen in an ER, Urgent care clinic, or been admitted since last visit?  If yes, When, where, and why    4. Has patient seen any other healthcare providers since last visit?  If yes, when, where, and why    5. Has patient had any bloodwork or XR done since last visit?    6. Is patient signed up for patient portal?

## 2024-09-19 ENCOUNTER — TELEPHONE (OUTPATIENT)
Dept: INTERNAL MEDICINE | Facility: CLINIC | Age: 84
End: 2024-09-19
Payer: MEDICARE

## 2024-09-19 NOTE — TELEPHONE ENCOUNTER
----- Message from Adis Espitia MA sent at 9/19/2024  1:57 PM CDT -----  Regarding: PV 10/3/24 @ 2:40 Dr. Stokes  1. Are there any outstanding tasks in the patient's chart? Yes, fasting labs    2. Is there any documentation in the chart? No    3.Has patient been seen in an ER, Urgent care clinic, or been admitted since last visit?  If yes, When, where, and why    4. Has patient seen any other healthcare providers since last visit?  If yes, when, where, and why    5. Has patient had any bloodwork or XR done since last visit?    6. Is patient signed up for patient portal?

## 2024-10-30 ENCOUNTER — TELEPHONE (OUTPATIENT)
Dept: INTERNAL MEDICINE | Facility: CLINIC | Age: 84
End: 2024-10-30
Payer: MEDICARE

## 2024-11-11 ENCOUNTER — LAB VISIT (OUTPATIENT)
Dept: LAB | Facility: HOSPITAL | Age: 84
End: 2024-11-11
Attending: INTERNAL MEDICINE
Payer: MEDICARE

## 2024-11-11 DIAGNOSIS — E16.1 OTHER HYPOGLYCEMIA: ICD-10-CM

## 2024-11-11 DIAGNOSIS — I10 PRIMARY HYPERTENSION: ICD-10-CM

## 2024-11-11 DIAGNOSIS — E78.00 HYPERCHOLESTEROLEMIA: ICD-10-CM

## 2024-11-11 DIAGNOSIS — E55.9 VITAMIN D DEFICIENCY: ICD-10-CM

## 2024-11-11 LAB
25(OH)D3+25(OH)D2 SERPL-MCNC: 35 NG/ML (ref 30–80)
ALBUMIN SERPL-MCNC: 4 G/DL (ref 3.4–4.8)
ALBUMIN/GLOB SERPL: 1.4 RATIO (ref 1.1–2)
ALP SERPL-CCNC: 78 UNIT/L (ref 40–150)
ALT SERPL-CCNC: 8 UNIT/L (ref 0–55)
ANION GAP SERPL CALC-SCNC: 6 MEQ/L
AST SERPL-CCNC: 16 UNIT/L (ref 5–34)
BACTERIA #/AREA URNS AUTO: ABNORMAL /HPF
BILIRUB SERPL-MCNC: 0.6 MG/DL
BILIRUB UR QL STRIP.AUTO: NEGATIVE
BUN SERPL-MCNC: 15.6 MG/DL (ref 9.8–20.1)
CALCIUM SERPL-MCNC: 9.4 MG/DL (ref 8.4–10.2)
CHLORIDE SERPL-SCNC: 109 MMOL/L (ref 98–107)
CHOLEST SERPL-MCNC: 209 MG/DL
CHOLEST/HDLC SERPL: 2 {RATIO} (ref 0–5)
CLARITY UR: ABNORMAL
CO2 SERPL-SCNC: 28 MMOL/L (ref 23–31)
COLOR UR AUTO: ABNORMAL
CREAT SERPL-MCNC: 0.74 MG/DL (ref 0.55–1.02)
CREAT/UREA NIT SERPL: 21
GFR SERPLBLD CREATININE-BSD FMLA CKD-EPI: >60 ML/MIN/1.73/M2
GLOBULIN SER-MCNC: 2.9 GM/DL (ref 2.4–3.5)
GLUCOSE SERPL-MCNC: 89 MG/DL (ref 82–115)
GLUCOSE UR QL STRIP: NORMAL
HDLC SERPL-MCNC: 93 MG/DL (ref 35–60)
HGB UR QL STRIP: ABNORMAL
KETONES UR QL STRIP: NEGATIVE
LDLC SERPL CALC-MCNC: 99 MG/DL (ref 50–140)
LEUKOCYTE ESTERASE UR QL STRIP: 75
MUCOUS THREADS URNS QL MICRO: ABNORMAL /LPF
NITRITE UR QL STRIP: ABNORMAL
PH UR STRIP: 7 [PH]
POTASSIUM SERPL-SCNC: 4.5 MMOL/L (ref 3.5–5.1)
PROT SERPL-MCNC: 6.9 GM/DL (ref 5.8–7.6)
PROT UR QL STRIP: NEGATIVE
RBC #/AREA URNS AUTO: ABNORMAL /HPF
SODIUM SERPL-SCNC: 143 MMOL/L (ref 136–145)
SP GR UR STRIP.AUTO: 1.01 (ref 1–1.03)
SQUAMOUS #/AREA URNS LPF: ABNORMAL /HPF
TRIGL SERPL-MCNC: 86 MG/DL (ref 37–140)
UROBILINOGEN UR STRIP-ACNC: NORMAL
VLDLC SERPL CALC-MCNC: 17 MG/DL
WBC #/AREA URNS AUTO: ABNORMAL /HPF

## 2024-11-11 PROCEDURE — 80053 COMPREHEN METABOLIC PANEL: CPT

## 2024-11-11 PROCEDURE — 81001 URINALYSIS AUTO W/SCOPE: CPT

## 2024-11-11 PROCEDURE — 36415 COLL VENOUS BLD VENIPUNCTURE: CPT

## 2024-11-11 PROCEDURE — 80061 LIPID PANEL: CPT

## 2024-11-11 PROCEDURE — 82306 VITAMIN D 25 HYDROXY: CPT

## 2024-11-13 ENCOUNTER — OFFICE VISIT (OUTPATIENT)
Dept: INTERNAL MEDICINE | Facility: CLINIC | Age: 84
End: 2024-11-13
Payer: MEDICARE

## 2024-11-13 VITALS
SYSTOLIC BLOOD PRESSURE: 136 MMHG | WEIGHT: 135 LBS | HEART RATE: 66 BPM | DIASTOLIC BLOOD PRESSURE: 84 MMHG | HEIGHT: 60 IN | TEMPERATURE: 97 F | RESPIRATION RATE: 16 BRPM | BODY MASS INDEX: 26.5 KG/M2 | OXYGEN SATURATION: 98 %

## 2024-11-13 DIAGNOSIS — M17.11 PRIMARY OSTEOARTHRITIS OF RIGHT KNEE: ICD-10-CM

## 2024-11-13 DIAGNOSIS — I10 PRIMARY HYPERTENSION: ICD-10-CM

## 2024-11-13 DIAGNOSIS — Z00.00 MEDICARE ANNUAL WELLNESS VISIT, SUBSEQUENT: Primary | ICD-10-CM

## 2024-11-13 DIAGNOSIS — Z78.0 POSTMENOPAUSAL: ICD-10-CM

## 2024-11-13 DIAGNOSIS — E78.00 HYPERCHOLESTEROLEMIA: ICD-10-CM

## 2024-11-13 DIAGNOSIS — Z23 NEED FOR VACCINATION: ICD-10-CM

## 2024-11-13 DIAGNOSIS — D47.2 SMOLDERING MULTIPLE MYELOMA: ICD-10-CM

## 2024-11-13 DIAGNOSIS — N32.81 OAB (OVERACTIVE BLADDER): ICD-10-CM

## 2024-11-13 PROBLEM — M17.0 OSTEOARTHRITIS OF BOTH KNEES: Status: RESOLVED | Noted: 2022-05-09 | Resolved: 2024-11-13

## 2024-11-13 PROBLEM — R01.1 MURMUR: Status: RESOLVED | Noted: 2024-02-07 | Resolved: 2024-11-13

## 2024-11-13 PROBLEM — M25.562 CHRONIC PAIN OF BOTH KNEES: Status: RESOLVED | Noted: 2024-02-07 | Resolved: 2024-11-13

## 2024-11-13 PROBLEM — E74.39 GLUCOSE INTOLERANCE: Status: RESOLVED | Noted: 2022-08-25 | Resolved: 2024-11-13

## 2024-11-13 PROBLEM — M25.561 CHRONIC PAIN OF BOTH KNEES: Status: RESOLVED | Noted: 2024-02-07 | Resolved: 2024-11-13

## 2024-11-13 PROBLEM — M85.80 OSTEOPENIA: Status: RESOLVED | Noted: 2022-05-09 | Resolved: 2024-11-13

## 2024-11-13 PROBLEM — C90.00 MULTIPLE MYELOMA, REMISSION STATUS UNSPECIFIED: Status: RESOLVED | Noted: 2024-02-07 | Resolved: 2024-11-13

## 2024-11-13 PROBLEM — J18.9 COMMUNITY ACQUIRED PNEUMONIA: Status: RESOLVED | Noted: 2022-05-16 | Resolved: 2024-11-13

## 2024-11-13 PROBLEM — D63.8 ANEMIA OF CHRONIC DISEASE: Status: RESOLVED | Noted: 2022-09-07 | Resolved: 2024-11-13

## 2024-11-13 PROBLEM — L85.3 DRY SKIN: Status: RESOLVED | Noted: 2022-12-12 | Resolved: 2024-11-13

## 2024-11-13 PROBLEM — G89.29 CHRONIC PAIN OF BOTH KNEES: Status: RESOLVED | Noted: 2024-02-07 | Resolved: 2024-11-13

## 2024-11-13 RX ORDER — OXYBUTYNIN CHLORIDE 5 MG/1
5 TABLET, EXTENDED RELEASE ORAL DAILY
COMMUNITY
End: 2024-11-13

## 2024-11-13 NOTE — PROGRESS NOTES
Internal Medicine      Patient ID: [id number]     Chief Complaint: Medicare Annual Wellness     HPI:     Anna Marie Campos is a 84 y.o. female here today for a Medicare Annual Wellness visit and comprehensive Health Risk Assessment.   Followed by Elizabeth Lejeune for smoldering myeloma we did a joint injection on her earlier this year last about 3 months to the right knee she really does not want an injection today her labs have been reviewed and they really do look great.  I did place a referral to orthopedics for establishment.  No acute complaints today      Health Maintenance         Date Due Completion Date    TETANUS VACCINE Never done ---    Shingles Vaccine (1 of 2) Never done ---    RSV Vaccine (Age 60+ and Pregnant patients) (1 - 1-dose 75+ series) Never done ---    COVID-19 Vaccine (4 - 2024-25 season) 09/01/2024 1/9/2023    DEXA Scan 11/17/2024 11/17/2022    Lipid Panel 11/11/2029 11/11/2024             Past Medical History:   Diagnosis Date    Anemia     Hyperlipidemia     Hypertension     OAB (overactive bladder)     Osteoarthritis of both knees     Osteopenia         Past Surgical History:   Procedure Laterality Date    BONE MARROW ASPIRATION N/A 9/28/2022    Procedure: ASPIRATION, BONE MARROW;  Surgeon: Mathieu Pastrana MD;  Location: [facility];  Service: General;  Laterality: N/A;    BREAST BIOPSY  05/01/2019    COLONOSCOPY          Social History     Socioeconomic History    Marital status:    Tobacco Use    Smoking status: Never    Smokeless tobacco: Never   Substance and Sexual Activity    Alcohol use: Yes     Alcohol/week: 1.0 standard drink of alcohol     Types: 1 Shots of liquor per week     Comment: occassionally    Drug use: Never    Sexual activity: Not Currently     Social Drivers of Health     Financial Resource Strain: Low Risk  (6/20/2023)    Overall Financial Resource Strain (CARDIA)     Difficulty of Paying Living Expenses: Not hard at all   Food Insecurity: No Food Insecurity  (6/20/2023)    Hunger Vital Sign     Worried About Running Out of Food in the Last Year: Never true     Ran Out of Food in the Last Year: Never true   Transportation Needs: No Transportation Needs (6/20/2023)    PRAPARE - Transportation     Lack of Transportation (Medical): No     Lack of Transportation (Non-Medical): No   Physical Activity: Insufficiently Active (6/20/2023)    Exercise Vital Sign     Days of Exercise per Week: 3 days     Minutes of Exercise per Session: 40 min   Stress: No Stress Concern Present (6/20/2023)    Citizen of [place] [employer] - Occupational Stress Questionnaire     Feeling of Stress : Not at all   Housing Stability: Low Risk  (2/7/2024)    Housing Stability Vital Sign     Unable to Pay for Housing in the Last Year: No     Number of Places Lived in the Last Year: 1     Unstable Housing in the Last Year: No        Family History   Problem Relation Name Age of Onset    Hypertension Mother      Hypertension Father      Stroke Father      Breast cancer Sister      Anemia Sister      Lung cancer Brother      Bone cancer Brother      Diabetes Daughter      Diabetes Son          Current Outpatient Medications   Medication Instructions    amLODIPine (NORVASC) 10 mg, Oral, Daily    aspirin (ECOTRIN) 81 mg, Daily    losartan (COZAAR) 25 mg, Oral, Daily    MYRBETRIQ 25 mg, Oral, Daily    rosuvastatin (CRESTOR) 5 mg, Oral, Daily       Review of patient's allergies indicates:   Allergen Reactions    Codeine      Other reaction(s): Unknown    Hydrocodone-acetaminophen Nausea Only        Immunization History   Administered Date(s) Administered    COVID-19, MRNA, LN-S, PF (Pfizer) (Purple Cap) 01/15/2021, 02/05/2021    COVID-19, mRNA, LNP-S, bivalent booster, PF (PFIZER OMICRON) 01/09/2023    Influenza (FLUAD) - Quadrivalent - Adjuvanted - PF *Preferred* (65+) 12/12/2022, 02/07/2024    Influenza - Trivalent - Fluad - Adjuvanted - PF (65 years and older 11/13/2024    Pneumococcal Conjugate -  20 Valent 06/20/2023        Patient Care Team:  Edson Vail MD as PCP - General (Internal Medicine)  Doc Olivier MD as Consulting Physician (Orthopedic Surgery)    Subjective:     Review of Systems    12 point review of systems conducted, negative except as stated in the history of present illness. See HPI for details.    Objective:     Visit Vitals  /84 (BP Location: Left arm, Patient Position: Sitting)   Pulse 66   Temp 96.9 °F (36.1 °C) (Temporal)   Resp 16   Ht 5' (1.524 m)   Wt 61.2 kg (135 lb)   SpO2 98%   BMI 26.37 kg/m²       Physical Exam  Constitutional:       Appearance: Normal appearance.   HENT:      Head: Normocephalic and atraumatic.   Eyes:      Extraocular Movements: Extraocular movements intact.      Pupils: Pupils are equal, round, and reactive to light.   Cardiovascular:      Rate and Rhythm: Normal rate and regular rhythm.   Pulmonary:      Effort: Pulmonary effort is normal.      Breath sounds: Normal breath sounds.   Skin:     General: Skin is warm and dry.   Neurological:      General: No focal deficit present.      Mental Status: She is alert.   Psychiatric:         Mood and Affect: Mood normal.           Labs Reviewed:     Chemistry:  Lab Results   Component Value Date     11/11/2024    K 4.5 11/11/2024    BUN 15.6 11/11/2024    CREATININE 0.74 11/11/2024    EGFRNORACEVR >60 11/11/2024    GLUCOSE 89 11/11/2024    CALCIUM 9.4 11/11/2024    ALKPHOS 78 11/11/2024    LABPROT 6.9 11/11/2024    ALBUMIN 4.0 11/11/2024    BILIDIR 0.2 03/14/2022    IBILI 0.20 03/14/2022    AST 16 11/11/2024    ALT 8 11/11/2024    MG 1.70 08/30/2022    [id number] 35 11/11/2024    TSH 1.3274 03/14/2022        Lab Results   Component Value Date    HGBA1C 5.2 08/25/2022        Hematology:  Lab Results   Component Value Date    WBC 4.94 02/22/2024    HGB 13.2 02/22/2024    HCT 41.1 02/22/2024     02/22/2024       Lipid Panel:  Lab Results   Component Value Date    CHOL 209 (H)  11/11/2024    HDL 93 (H) 11/11/2024    LDL 99.00 11/11/2024    TRIG 86 11/11/2024    TOTALCHOLEST 2 11/11/2024        Urine:  Lab Results   Component Value Date    APPEARANCEUA Turbid (A) 11/11/2024    SGUA 1.009 11/11/2024    PROTEINUA Negative 11/11/2024    KETONESUA Negative 11/11/2024    LEUKOCYTESUR 75 (A) 11/11/2024    RBCUA None Seen 11/11/2024    WBCUA 0-5 11/11/2024    BACTERIA Many (A) 11/11/2024    SQEPUA Trace 11/11/2024    PROTEINURINE 96.3 09/07/2022        Assessment:       ICD-10-CM ICD-9-CM   1. Medicare annual wellness visit, subsequent  Z00.00 V70.0   2. Need for vaccination  Z23 V05.9   3. Primary osteoarthritis of right knee  M17.11 715.16   4. 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 Postmenopausal  Z78.0 V49.81   6. Hypercholesterolemia  E78.00 272.0   7. Primary hypertension  I10 401.9   8. OAB (overactive bladder)  N32.81 596.51        Plan:     1. Medicare annual wellness visit, subsequent  Assessment & Plan:  General health maintenance education given, mammogram order placed.  Lab orders placed      2. Need for vaccination  -     influenza (adjuvanted) (Fluad) 45 mcg/0.5 mL IM vaccine (> or = 66 yo) 0.5 mL    3. Primary osteoarthritis of right knee  -     Ambulatory referral/consult to Orthopedics; Future; Expected date: 11/20/2024    4. Smoldering multiple myeloma  Assessment & Plan:  Has upcoming visit for labs for Hematology/Oncology  Most recent renal function and blood counts look excellent    Orders:  -     DXA Bone Density Axial Skeleton 1 or more sites; Future; Expected date: 11/13/2024    5. Postmenopausal  -     DXA Bone Density Axial Skeleton 1 or more sites; Future; Expected date: 11/13/2024    6. Hypercholesterolemia  Assessment & Plan:  Lab Results   Component Value Date    LDL 99.00 11/11/2024    TRIG 86 11/11/2024    HDL 93 (H) 11/11/2024    TOTALCHOLEST 2 11/11/2024     Hyperlipidemia Medications               rosuvastatin (CRESTOR) 5 MG tablet Take 1 tablet (5 mg  total) by mouth once daily.     Stressed importance of dietary modifications. Follow a low cholesterol, low saturated fat diet with less that 200mg of cholesterol a day.  Avoid fried foods and high saturated fats (high saturated fats less than 7% of calories).  Add Flax Seed/Fish Oil supplements to diet. Increase dietary fiber.  Regular exercise can reduce LDL and raise HDL. Stressed importance of physical activity 5 times per week for 30 minutes per day.       7. Primary hypertension  Assessment & Plan:  Well controlled.   Hypertension Medications               amLODIPine (NORVASC) 10 MG tablet Take 1 tablet (10 mg total) by mouth once daily.    losartan (COZAAR) 25 MG tablet Take 1 tablet (25 mg total) by mouth once daily.     Low Sodium Diet (DASH Diet - Less than 2 grams of sodium per day).  Monitor blood pressure daily and log. Report consistent numbers greater than 140/90.  Maintain healthy weight with goal BMI <30. Exercise 30 minutes per day, 5 days per week.      8. OAB (overactive bladder)  Assessment & Plan:  Much improved on Myrbetriq           A comprehensive HEALTH RISK ASSESSMENT was completed today. Results are summarized below:    There are NO EMOTIONAL/SOCIAL CONCERNS identified on today's screening for Social Isolation, Depression and Anxiety.    There are NO COGNITIVE FUNCTION CONCERNS identified on today's screening.  The following FUNCTIONAL AND/OR SAFETY CONCERNS were identified on today's screening for Physical Symptoms, Nutritional, Home Safety/Living Situation, Fall Risk, Activities of Daily Living, Independent Activities of Daily Living, Physical Activity,Timed Up and Go test and Whisper test::   *Patient reports she NEEDS AMBULATION ASSISTANCE. (Do you use an assistance device to easily get around?: (!) Yes)(Ambulation Assistance: Cane)     The patient reports NO OPIOID PRESCRIPTIONS. This was confirmed through medication reconciliation and the [facility] website.    The patient is NOT A TOBACCO  USER.          Advance Care Planning   Advance Care Planning     Date: 11/13/2024  Patient did not wish or was not able to name a surrogate decision maker or provide an Advance Care Plan.       I attest to discussing Advance Care Planning with patient and/or family member.  Education was provided including the importance of the Health Care Power of , Advance Directives, and/or [place] documentation.  The patient expressed understanding to the importance of this information and discussion.       Provided patient with a 5-10 year written screening schedule and personal prevention plan. Recommendations were developed using the USPSTF age appropriate recommendations. Education, counseling, and referrals were provided as needed. After Visit Summary printed and given to patient, which includes a list of additional screenings\tests needed.    No follow-ups on file. In addition to their scheduled follow up, the patient has also been instructed to follow up on as needed basis.     Future Appointments   Date Time Provider Department Center   5/13/2025  9:20 AM Edson Vail MD [id number]        Edson Vail MD

## 2024-11-13 NOTE — ASSESSMENT & PLAN NOTE
Lab Results   Component Value Date    LDL 99.00 11/11/2024    TRIG 86 11/11/2024    HDL 93 (H) 11/11/2024    TOTALCHOLEST 2 11/11/2024     Hyperlipidemia Medications               rosuvastatin (CRESTOR) 5 MG tablet Take 1 tablet (5 mg total) by mouth once daily.     Stressed importance of dietary modifications. Follow a low cholesterol, low saturated fat diet with less that 200mg of cholesterol a day.  Avoid fried foods and high saturated fats (high saturated fats less than 7% of calories).  Add Flax Seed/Fish Oil supplements to diet. Increase dietary fiber.  Regular exercise can reduce LDL and raise HDL. Stressed importance of physical activity 5 times per week for 30 minutes per day.

## 2025-03-19 ENCOUNTER — TELEPHONE (OUTPATIENT)
Dept: INTERNAL MEDICINE | Facility: CLINIC | Age: 85
End: 2025-03-19
Payer: MEDICARE

## 2025-03-19 NOTE — TELEPHONE ENCOUNTER
----- Message from Maryanne sent at 3/19/2025 11:02 AM CDT -----  Type:  Sooner Apoointment RequestCaller is requesting a sooner appointment.  Caller declined first available appointment listed below.  Caller will not accept being placed on the waitlist and is requesting a message be sent to doctor.Name of Caller:Geovanna Cee - pt daughterWhen is the first available appointment? MaySymptoms: dizzy, vomiting - for 1 dayWould the patient rather a call back or a response via [facility]? [employer]Be[employer] Call Back Number: [phone] Information:  pt needs sooner appt than May for symptoms

## 2025-03-20 ENCOUNTER — TELEPHONE (OUTPATIENT)
Dept: INTERNAL MEDICINE | Facility: CLINIC | Age: 85
End: 2025-03-20
Payer: MEDICARE

## 2025-03-20 NOTE — TELEPHONE ENCOUNTER
Patients (relative) Geovanna called stating pt. needed to see someone due to dizziness & throwing-up. appt. was scheduled & confirmed by Geovanna 3/21/25 @9:40 w/EMILIANO Ventura

## 2025-03-24 ENCOUNTER — OFFICE VISIT (OUTPATIENT)
Dept: INTERNAL MEDICINE | Facility: CLINIC | Age: 85
End: 2025-03-24
Payer: MEDICARE

## 2025-03-24 VITALS
WEIGHT: 135 LBS | HEIGHT: 60 IN | BODY MASS INDEX: 26.5 KG/M2 | DIASTOLIC BLOOD PRESSURE: 78 MMHG | SYSTOLIC BLOOD PRESSURE: 136 MMHG | OXYGEN SATURATION: 97 % | HEART RATE: 73 BPM

## 2025-03-24 DIAGNOSIS — R42 DIZZINESS: Primary | ICD-10-CM

## 2025-03-24 DIAGNOSIS — I10 PRIMARY HYPERTENSION: ICD-10-CM

## 2025-03-24 PROCEDURE — 3075F SYST BP GE 130 - 139MM HG: CPT | Mod: CPTII,,, | Performed by: NURSE PRACTITIONER

## 2025-03-24 PROCEDURE — 1101F PT FALLS ASSESS-DOCD LE1/YR: CPT | Mod: CPTII,,, | Performed by: NURSE PRACTITIONER

## 2025-03-24 PROCEDURE — 3078F DIAST BP <80 MM HG: CPT | Mod: CPTII,,, | Performed by: NURSE PRACTITIONER

## 2025-03-24 PROCEDURE — 1159F MED LIST DOCD IN RCRD: CPT | Mod: CPTII,,, | Performed by: NURSE PRACTITIONER

## 2025-03-24 PROCEDURE — 3288F FALL RISK ASSESSMENT DOCD: CPT | Mod: CPTII,,, | Performed by: NURSE PRACTITIONER

## 2025-03-24 PROCEDURE — 1160F RVW MEDS BY RX/DR IN RCRD: CPT | Mod: CPTII,,, | Performed by: NURSE PRACTITIONER

## 2025-03-24 PROCEDURE — 99213 OFFICE O/P EST LOW 20 MIN: CPT | Mod: ,,, | Performed by: NURSE PRACTITIONER

## 2025-03-24 NOTE — PROGRESS NOTES
Internal Medicine      Patient Name:  Anna Marie Campos  Patient ID:  [id number]    Chief Complaint:  Dizziness (Tuesday ) and Emesis (Tuesday )      Anna Marie Campos is a 84 y.o. female, known to Dr Stokes, is here today for requested visit.  Medical comorbidities include HTN, HLD, smoldering multiple myeloma, osteoarthritis.    Presents with complaints of dizziness/lightheadedness x1 episode on 03/18/2025.  She also reported vomiting a small amount of clear liquid x1 episode.  She states dizziness/lightheadedness occurred while she was standing at the sink washing dishes.  She felt as though she was going to lose consciousness.  She denies unilateral weakness, confusion, vision changes.  She was able to sit down and grandson gave her a cold towel to wipe her face.  After a few minutes, symptoms improved and she was able to returned to normal activities.  Denies other associated symptoms.  Does state she did not eat breakfast or lunch that day.  Denies recurrent episodes of dizziness and/or vomiting.    Last AWV:  11/13/2024        Past Medical History:   Diagnosis Date    Anemia     Hyperlipidemia     Hypertension     OAB (overactive bladder)     Osteoarthritis of both knees     Osteopenia         Past Surgical History:   Procedure Laterality Date    BONE MARROW ASPIRATION N/A 9/28/2022    Procedure: ASPIRATION, BONE MARROW;  Surgeon: Mathieu Pastrana MD;  Location: [facility];  Service: General;  Laterality: N/A;    BREAST BIOPSY  05/01/2019    COLONOSCOPY          Social History     Tobacco Use    Smoking status: Never    Smokeless tobacco: Never   Substance and Sexual Activity    Alcohol use: Yes     Alcohol/week: 1.0 standard drink of alcohol     Types: 1 Shots of liquor per week     Comment: occassionally    Drug use: Never    Sexual activity: Not Currently        Current Outpatient Medications   Medication Instructions    amLODIPine (NORVASC) 10 mg, Oral, Daily    aspirin (ECOTRIN) 81 mg, Daily    losartan  (COZAAR) 25 mg, Oral, Daily    MYRBETRIQ 25 mg, Oral, Daily    rosuvastatin (CRESTOR) 5 mg, Oral, Daily       Review of patient's allergies indicates:   Allergen Reactions    Codeine      Other reaction(s): Unknown    Hydrocodone-acetaminophen Nausea Only        Patient Care Team:  Edson Vail MD as PCP - General (Internal Medicine)  Doc Olivier MD as Consulting Physician (Orthopedic Surgery)       Subjective:    Review of Systems   Constitutional:  Negative for appetite change, chills, diaphoresis and fever.   HENT:  Negative for ear pain, sinus pain and sore throat.    Eyes:  Negative for pain and visual disturbance.   Respiratory:  Negative for cough, shortness of breath and wheezing.    Cardiovascular:  Negative for chest pain, palpitations and leg swelling.   Gastrointestinal:  Negative for abdominal pain, constipation, diarrhea, nausea and vomiting (denies further episodes of vomiting).   Endocrine: Negative for cold intolerance.   Genitourinary:  Negative for difficulty urinating, dysuria, frequency and hematuria.   Musculoskeletal:  Negative for arthralgias and myalgias.   Skin:  Negative for color change and rash.   Allergic/Immunologic: Negative.    Neurological:  Negative for dizziness (now resolved), syncope, light-headedness and numbness.   Hematological: Negative.    Psychiatric/Behavioral: Negative.  Negative for dysphoric mood and suicidal ideas. The patient is not nervous/anxious.    All other systems reviewed and are negative.      Objective:    Visit Vitals  /78 (BP Location: Left arm, Patient Position: Sitting)   Pulse 73   Ht 5' (1.524 m)   Wt 61.2 kg (135 lb)   SpO2 97%   BMI 26.37 kg/m²       Physical Exam  Vitals and nursing note reviewed.   Constitutional:       General: She is not in acute distress.     Appearance: She is not ill-appearing.   HENT:      Head: Normocephalic and atraumatic.      Mouth/Throat:      Mouth: Mucous membranes are moist.      Pharynx:  Oropharynx is clear.   Eyes:      General: No scleral icterus.     Extraocular Movements: Extraocular movements intact.      Conjunctiva/sclera: Conjunctivae normal.      Pupils: Pupils are equal, round, and reactive to light.   Neck:      Vascular: No carotid bruit.   Cardiovascular:      Rate and Rhythm: Normal rate and regular rhythm.      Heart sounds: Normal heart sounds. No murmur heard.     No friction rub. No gallop.   Pulmonary:      Effort: Pulmonary effort is normal. No respiratory distress.      Breath sounds: Normal breath sounds. No wheezing, rhonchi or rales.   Abdominal:      General: Bowel sounds are normal. There is no distension.      Palpations: Abdomen is soft. There is no mass.      Tenderness: There is no abdominal tenderness.   Musculoskeletal:         General: Normal range of motion.      Cervical back: Normal range of motion and neck supple.   Lymphadenopathy:      Cervical: No cervical adenopathy.   Skin:     General: Skin is warm and dry.      Capillary Refill: Capillary refill takes less than 2 seconds.   Neurological:      General: No focal deficit present.      Mental Status: She is alert and oriented to person, place, and time.   Psychiatric:         Mood and Affect: Mood normal.         Behavior: Behavior normal.         Labs Reviewed:    Chemistry:  Lab Results   Component Value Date     11/11/2024    K 4.5 11/11/2024    BUN 15.6 11/11/2024    CREATININE 0.74 11/11/2024    EGFRNORACEVR >60 11/11/2024    GLUCOSE 89 11/11/2024    CALCIUM 9.4 11/11/2024    ALKPHOS 78 11/11/2024    LABPROT 6.9 11/11/2024    ALBUMIN 4.0 11/11/2024    BILIDIR 0.2 03/14/2022    IBILI 0.20 03/14/2022    AST 16 11/11/2024    ALT 8 11/11/2024    MG 1.70 08/30/2022    [id number] 35 11/11/2024    TSH 1.3274 03/14/2022        Lab Results   Component Value Date    HGBA1C 5.2 08/25/2022        Hematology:  Lab Results   Component Value Date    WBC 4.94 02/22/2024    HGB 13.2 02/22/2024    HCT 41.1  02/22/2024     02/22/2024       Lipid Panel:  Lab Results   Component Value Date    CHOL 209 (H) 11/11/2024    HDL 93 (H) 11/11/2024    LDL 99.00 11/11/2024    TRIG 86 11/11/2024    TOTALCHOLEST 2 11/11/2024        Urine:  Lab Results   Component Value Date    APPEARANCEUA Turbid (A) 11/11/2024    SGUA 1.009 11/11/2024    PROTEINUA Negative 11/11/2024    KETONESUA Negative 11/11/2024    LEUKOCYTESUR 75 (A) 11/11/2024    RBCUA None Seen 11/11/2024    WBCUA 0-5 11/11/2024    BACTERIA Many (A) 11/11/2024    SQEPUA Trace 11/11/2024    PROTEINURINE 96.3 09/07/2022          Assessment:      ICD-10-CM ICD-9-CM   1. Dizziness  R42 780.4   2. Primary hypertension  I10 401.9          Plan:    1. Dizziness  Assessment & Plan:  Now resolved  Instructed to notify clinic if symptoms return.      2. Primary hypertension  Assessment & Plan:  Well-controlled in office today   Continue losartan 25 mg daily and amlodipine 10 mg daily   Encouraged low-sodium diet   Continue to monitor blood pressure at home and report readings consistently above 140/90.           Follow up for Previously scheduled and PRN if need. In addition to their scheduled follow up, the patient has also been instructed to follow up on as needed basis.       Future Appointments   Date Time Provider Department Center   5/13/2025  9:20 AM Edson Vail MD [facility] [id number]          JOSSELIN Bueno

## 2025-05-05 ENCOUNTER — TELEPHONE (OUTPATIENT)
Dept: INTERNAL MEDICINE | Facility: CLINIC | Age: 85
End: 2025-05-05
Payer: MEDICARE

## 2025-05-05 DIAGNOSIS — E78.00 HYPERCHOLESTEROLEMIA: ICD-10-CM

## 2025-05-05 DIAGNOSIS — I10 PRIMARY HYPERTENSION: Primary | ICD-10-CM

## 2025-05-05 NOTE — TELEPHONE ENCOUNTER
----- Message from Med Assistant Arceo sent at 5/5/2025  9:43 AM CDT -----  Regarding: ARLIN 5/13/25 @ 9:20 Dr. Stokes  1. Are there any outstanding tasks in the patient's chart? Yes, Fasting Labs 2. Does patient have home blood pressure cuff?  [ ] Yes  /   [ ] No(If yes, please have patient bring to appointment for validation.)3. Remind patient to bring in a list of medications or bottles of all medications including: A. All Prescription MedicationsB. Over-the-Counter Supplements and/or VitaminsC. Drops (ear and/or eye)D. Topical Creams

## 2025-05-06 ENCOUNTER — LAB VISIT (OUTPATIENT)
Dept: LAB | Facility: HOSPITAL | Age: 85
End: 2025-05-06
Attending: INTERNAL MEDICINE
Payer: MEDICARE

## 2025-05-06 DIAGNOSIS — E78.00 HYPERCHOLESTEROLEMIA: ICD-10-CM

## 2025-05-06 DIAGNOSIS — I10 PRIMARY HYPERTENSION: ICD-10-CM

## 2025-05-06 LAB
ALBUMIN SERPL-MCNC: 3.8 G/DL (ref 3.4–4.8)
ALBUMIN/GLOB SERPL: 1.1 RATIO (ref 1.1–2)
ALP SERPL-CCNC: 76 UNIT/L (ref 40–150)
ALT SERPL-CCNC: 11 UNIT/L (ref 0–55)
ANION GAP SERPL CALC-SCNC: 8 MEQ/L
AST SERPL-CCNC: 27 UNIT/L (ref 11–45)
BACTERIA #/AREA URNS AUTO: ABNORMAL /HPF
BILIRUB SERPL-MCNC: 0.6 MG/DL
BILIRUB UR QL STRIP.AUTO: NEGATIVE
BUN SERPL-MCNC: 17.5 MG/DL (ref 9.8–20.1)
CALCIUM SERPL-MCNC: 9.3 MG/DL (ref 8.4–10.2)
CHLORIDE SERPL-SCNC: 112 MMOL/L (ref 98–107)
CHOLEST SERPL-MCNC: 190 MG/DL
CHOLEST/HDLC SERPL: 3 {RATIO} (ref 0–5)
CLARITY UR: ABNORMAL
CO2 SERPL-SCNC: 23 MMOL/L (ref 23–31)
COLOR UR AUTO: ABNORMAL
CREAT SERPL-MCNC: 0.75 MG/DL (ref 0.55–1.02)
CREAT/UREA NIT SERPL: 23
GFR SERPLBLD CREATININE-BSD FMLA CKD-EPI: >60 ML/MIN/1.73/M2
GLOBULIN SER-MCNC: 3.6 GM/DL (ref 2.4–3.5)
GLUCOSE SERPL-MCNC: 93 MG/DL (ref 82–115)
GLUCOSE UR QL STRIP: NORMAL
HDLC SERPL-MCNC: 67 MG/DL (ref 35–60)
HGB UR QL STRIP: ABNORMAL
KETONES UR QL STRIP: NEGATIVE
LDLC SERPL CALC-MCNC: 87 MG/DL (ref 50–140)
LEUKOCYTE ESTERASE UR QL STRIP: 25
MUCOUS THREADS URNS QL MICRO: ABNORMAL /LPF
NITRITE UR QL STRIP: ABNORMAL
PH UR STRIP: 6 [PH]
POTASSIUM SERPL-SCNC: 4.9 MMOL/L (ref 3.5–5.1)
PROT SERPL-MCNC: 7.4 GM/DL (ref 5.8–7.6)
PROT UR QL STRIP: ABNORMAL
RBC #/AREA URNS AUTO: ABNORMAL /HPF
SODIUM SERPL-SCNC: 143 MMOL/L (ref 136–145)
SP GR UR STRIP.AUTO: 1.02 (ref 1–1.03)
SQUAMOUS #/AREA URNS LPF: ABNORMAL /HPF
TRIGL SERPL-MCNC: 181 MG/DL (ref 37–140)
UROBILINOGEN UR STRIP-ACNC: NORMAL
VLDLC SERPL CALC-MCNC: 36 MG/DL
WBC #/AREA URNS AUTO: ABNORMAL /HPF

## 2025-05-06 PROCEDURE — 36415 COLL VENOUS BLD VENIPUNCTURE: CPT

## 2025-05-06 PROCEDURE — 81001 URINALYSIS AUTO W/SCOPE: CPT

## 2025-05-06 PROCEDURE — 80053 COMPREHEN METABOLIC PANEL: CPT

## 2025-05-06 PROCEDURE — 80061 LIPID PANEL: CPT

## 2025-05-12 DIAGNOSIS — E78.00 HYPERCHOLESTEROLEMIA: ICD-10-CM

## 2025-05-12 DIAGNOSIS — I10 PRIMARY HYPERTENSION: ICD-10-CM

## 2025-05-12 RX ORDER — AMLODIPINE BESYLATE 10 MG/1
10 TABLET ORAL
Qty: 30 TABLET | Refills: 11 | Status: SHIPPED | OUTPATIENT
Start: 2025-05-12

## 2025-05-13 ENCOUNTER — TELEPHONE (OUTPATIENT)
Dept: INTERNAL MEDICINE | Facility: CLINIC | Age: 85
End: 2025-05-13

## 2025-05-23 ENCOUNTER — OFFICE VISIT (OUTPATIENT)
Dept: INTERNAL MEDICINE | Facility: CLINIC | Age: 85
End: 2025-05-23
Payer: MEDICARE

## 2025-05-23 ENCOUNTER — TELEPHONE (OUTPATIENT)
Dept: INTERNAL MEDICINE | Facility: CLINIC | Age: 85
End: 2025-05-23

## 2025-05-23 VITALS
RESPIRATION RATE: 16 BRPM | TEMPERATURE: 97 F | OXYGEN SATURATION: 99 % | WEIGHT: 133 LBS | SYSTOLIC BLOOD PRESSURE: 134 MMHG | HEART RATE: 65 BPM | BODY MASS INDEX: 26.11 KG/M2 | DIASTOLIC BLOOD PRESSURE: 82 MMHG | HEIGHT: 60 IN

## 2025-05-23 DIAGNOSIS — L30.1 DYSHIDROTIC ECZEMA: ICD-10-CM

## 2025-05-23 DIAGNOSIS — E78.00 HYPERCHOLESTEROLEMIA: ICD-10-CM

## 2025-05-23 DIAGNOSIS — D47.2 SMOLDERING MULTIPLE MYELOMA: Primary | ICD-10-CM

## 2025-05-23 DIAGNOSIS — Z78.0 POSTMENOPAUSAL STATE: ICD-10-CM

## 2025-05-23 DIAGNOSIS — I10 PRIMARY HYPERTENSION: ICD-10-CM

## 2025-05-23 RX ORDER — BETAMETHASONE DIPROPIONATE 0.5 MG/G
OINTMENT TOPICAL 2 TIMES DAILY
Qty: 15 G | Refills: 0 | Status: SHIPPED | OUTPATIENT
Start: 2025-05-23

## 2025-05-23 NOTE — PROGRESS NOTES
Internal Medicine    Patient ID: [id number]     Chief Complaint: Hypertension (6 month f/u) and Hyperlipidemia (6 month f/u)      HPI:     Anna Marie Campos is a 84 y.o. female here today for a follow up.      84-year-old woman, had an episode of low blood pressure yesterday while cleaning and seasoning chicken. She was sitting on a bar stool during this activity. She reports feeling well today.    She mentions ongoing issues with her fingers, describing them as cracked and dry. This condition is exacerbated by her frequent hand use. The symptoms are consistent with eczema, characterized by redness and cracking of the skin.    She was last evaluated by Dr. Lejeune, a hematologist, in February of last year. A follow-up visit was recommended for 3 months later, but she has not yet returned for this follow-up visit.    She denies chest pain and shortness of breath.    TEST RESULTS:  Patient underwent an EKG, which showed normal results.    SOCIAL HISTORY:  Occupation: Catering        Past Medical History:   Diagnosis Date    Anemia     Hyperlipidemia     Hypertension     OAB (overactive bladder)     Osteoarthritis of both knees     Osteopenia         Past Surgical History:   Procedure Laterality Date    BONE MARROW ASPIRATION N/A 9/28/2022    Procedure: ASPIRATION, BONE MARROW;  Surgeon: Mathieu Pastrana MD;  Location: [facility];  Service: General;  Laterality: N/A;    BREAST BIOPSY  05/01/2019    COLONOSCOPY          Social History     Tobacco Use    Smoking status: Never    Smokeless tobacco: Never   Substance and Sexual Activity    Alcohol use: Yes     Alcohol/week: 1.0 standard drink of alcohol     Types: 1 Shots of liquor per week     Comment: occassionally    Drug use: Never    Sexual activity: Not Currently        Current Outpatient Medications   Medication Instructions    amLODIPine (NORVASC) 10 mg, Oral    aspirin (ECOTRIN) 81 mg, Daily    betamethasone dipropionate (BETANATE) 0.05 % ointment Topical (Top), 2 times  daily, With eczema flare to the hands    losartan (COZAAR) 25 mg, Oral, Daily    MYRBETRIQ 25 mg, Oral, Daily    rosuvastatin (CRESTOR) 5 mg, Oral, Daily       Review of patient's allergies indicates:   Allergen Reactions    Codeine      Other reaction(s): Unknown    Hydrocodone-acetaminophen Nausea Only        Patient Care Team:  Edson Vail MD as PCP - General (Internal Medicine)  Doc Olivier MD as Consulting Physician (Orthopedic Surgery)     Subjective:     Review of Systems    12 point review of systems conducted, negative except as stated in the history of present illness. See HPI for details.    Objective:     Visit Vitals  /82 (BP Location: Right arm, Patient Position: Sitting)   Pulse 65   Temp 97.1 °F (36.2 °C) (Temporal)   Resp 16   Ht 5' (1.524 m)   Wt 60.3 kg (133 lb)   SpO2 99%   BMI 25.97 kg/m²       Physical Exam  Constitutional:       Appearance: Normal appearance.   HENT:      Head: Normocephalic and atraumatic.   Eyes:      Extraocular Movements: Extraocular movements intact.      Pupils: Pupils are equal, round, and reactive to light.   Cardiovascular:      Rate and Rhythm: Normal rate and regular rhythm.   Pulmonary:      Effort: Pulmonary effort is normal.      Breath sounds: Normal breath sounds.   Skin:     General: Skin is warm and dry.   Neurological:      General: No focal deficit present.      Mental Status: She is alert.   Psychiatric:         Mood and Affect: Mood normal.         Labs Reviewed:     Chemistry:  Lab Results   Component Value Date     05/06/2025    K 4.9 05/06/2025    BUN 17.5 05/06/2025    CREATININE 0.75 05/06/2025    EGFRNORACEVR >60 05/06/2025    CALCIUM 9.3 05/06/2025    ALKPHOS 76 05/06/2025    ALBUMIN 3.8 05/06/2025    BILIDIR 0.2 03/14/2022    IBILI 0.20 03/14/2022    AST 27 05/06/2025    ALT 11 05/06/2025    MG 1.70 08/30/2022    [id number] 35 11/11/2024    TSH 1.3274 03/14/2022        Lab Results   Component Value Date    HGBA1C 5.2  08/25/2022        Hematology:  Lab Results   Component Value Date    WBC 4.94 02/22/2024    HGB 13.2 02/22/2024    HCT 41.1 02/22/2024     02/22/2024       Lipid Panel:  Lab Results   Component Value Date    CHOL 190 05/06/2025    HDL 67 (H) 05/06/2025    LDL 87.00 05/06/2025    TRIG 181 (H) 05/06/2025    TOTALCHOLEST 3 05/06/2025        Urine:  Lab Results   Component Value Date    APPEARANCEUA Turbid (A) 05/06/2025    SGUA 1.020 05/06/2025    PROTEINUA Trace (A) 05/06/2025    KETONESUA Negative 05/06/2025    LEUKOCYTESUR 25 (A) 05/06/2025    RBCUA 0-5 05/06/2025    WBCUA 6-10 (A) 05/06/2025    BACTERIA Occasional (A) 05/06/2025    SQEPUA Trace 05/06/2025    PROTEINURINE 96.3 09/07/2022        Assessment and Plan:     Assessment & Plan    D47.2 Smoldering multiple myeloma  I10 Primary hypertension  E78.00 Hypercholesterolemia  Z78.0 Postmenopausal state  L30.1 Dyshidrotic eczema    IMPRESSION:   Low BP yesterday, likely due to prolonged sitting on a bar stool while preparing food. EKG unchanged from previous, no concerning findings. Patient looks great, hemodynamics are stable. No chest pain, no SOB, no dizziness.    Lab results are good, weight gain since last visit.   Hand condition, likely eczema, requiring topical steroid treatment.    D47.2 SMOLDERING MULTIPLE MYELOMA:  - Follow up with hematologist Dr.Lejeune, as it has been over a year since last visit.    I10 PRIMARY HYPERTENSION:  - Educated on importance of taking breaks and using compression socks when standing for long periods or sitting on bar stools to prevent blood pooling in lower extremities.  EKG, ventricular rate 58 beats per minute, normal ST segment AK interval.      L30.1 DYSHIDROTIC ECZEMA:  - Started topical steroid cream for hand eczema.          No follow-ups on file. In addition to their scheduled follow up, the patient has also been instructed to follow up on as needed basis.     Future Appointments   Date Time Provider Department  [facility]   11/18/2025  9:40 AM Edson Vail MD [facility]        Edson Vail MD

## 2025-05-23 NOTE — TELEPHONE ENCOUNTER
"Note was left on appt notes this morning by Camille Jacinto, "pt son Remi called to add current medical status BP 95/58 taken yesterday afternoon 5/22/25, in and out of consciousness, elevated heart rate..mma" I called Remi back to let him and pt know that pt needs to report to ED not the office per Dr. Stokes. Remi stated that pt is already on the way to office with Geovanna Cee. I called Geovanna's number on file and it went straight to . Patient came into office for appointment. While triaging her she stated that only happened last night and the ambulance was called. She noted that she ws checked out by [facility], but was not taken to the hospital.   "

## 2025-05-29 ENCOUNTER — PATIENT MESSAGE (OUTPATIENT)
Dept: INTERNAL MEDICINE | Facility: CLINIC | Age: 85
End: 2025-05-29
Payer: MEDICARE

## 2025-05-29 ENCOUNTER — TELEPHONE (OUTPATIENT)
Dept: HEMATOLOGY/ONCOLOGY | Facility: CLINIC | Age: 85
End: 2025-05-29
Payer: MEDICARE

## 2025-05-29 DIAGNOSIS — C90.00 MULTIPLE MYELOMA, REMISSION STATUS UNSPECIFIED: ICD-10-CM

## 2025-05-29 DIAGNOSIS — D47.2 SMOLDERING MULTIPLE MYELOMA: ICD-10-CM

## 2025-05-29 NOTE — TELEPHONE ENCOUNTER
Hello,    I wanted to let you know that numerous attempts have been made to reach pt to make a f/u appt with Dr. Lejeune.    We will be happy to make an appt for this pt once she returns our call.    She can call Lashell Mcelroy @ [phone]    Thanks,  VALENTINA Hatch

## 2025-06-04 DIAGNOSIS — I10 PRIMARY HYPERTENSION: ICD-10-CM

## 2025-06-04 DIAGNOSIS — E78.00 HYPERCHOLESTEROLEMIA: ICD-10-CM

## 2025-06-04 RX ORDER — LOSARTAN POTASSIUM 25 MG/1
25 TABLET ORAL
Qty: 90 TABLET | Refills: 3 | Status: SHIPPED | OUTPATIENT
Start: 2025-06-04

## 2025-06-04 RX ORDER — ROSUVASTATIN CALCIUM 5 MG/1
5 TABLET, COATED ORAL
Qty: 30 TABLET | Refills: 11 | Status: SHIPPED | OUTPATIENT
Start: 2025-06-04

## 2025-06-10 ENCOUNTER — LAB VISIT (OUTPATIENT)
Dept: LAB | Facility: HOSPITAL | Age: 85
End: 2025-06-10
Attending: STUDENT IN AN ORGANIZED HEALTH CARE EDUCATION/TRAINING PROGRAM
Payer: MEDICARE

## 2025-06-10 DIAGNOSIS — C90.00 MULTIPLE MYELOMA, REMISSION STATUS UNSPECIFIED: ICD-10-CM

## 2025-06-10 DIAGNOSIS — D47.2 SMOLDERING MULTIPLE MYELOMA: ICD-10-CM

## 2025-06-10 LAB
ALBUMIN SERPL-MCNC: 4.1 G/DL (ref 3.4–4.8)
ALBUMIN/GLOB SERPL: 1.2 RATIO (ref 1.1–2)
ALP SERPL-CCNC: 80 UNIT/L (ref 40–150)
ALT SERPL-CCNC: 10 UNIT/L (ref 0–55)
ANION GAP SERPL CALC-SCNC: 9 MEQ/L
AST SERPL-CCNC: 15 UNIT/L (ref 11–45)
BASOPHILS # BLD AUTO: 0.02 X10(3)/MCL
BASOPHILS NFR BLD AUTO: 0.5 %
BILIRUB SERPL-MCNC: 0.7 MG/DL
BUN SERPL-MCNC: 16.5 MG/DL (ref 9.8–20.1)
CALCIUM SERPL-MCNC: 9.4 MG/DL (ref 8.4–10.2)
CHLORIDE SERPL-SCNC: 109 MMOL/L (ref 98–107)
CO2 SERPL-SCNC: 25 MMOL/L (ref 23–31)
CREAT SERPL-MCNC: 0.82 MG/DL (ref 0.55–1.02)
CREAT/UREA NIT SERPL: 20
EOSINOPHIL # BLD AUTO: 0.23 X10(3)/MCL (ref 0–0.9)
EOSINOPHIL NFR BLD AUTO: 5.5 %
ERYTHROCYTE [DISTWIDTH] IN BLOOD BY AUTOMATED COUNT: 12.5 % (ref 11.5–17)
GFR SERPLBLD CREATININE-BSD FMLA CKD-EPI: >60 ML/MIN/1.73/M2
GLOBULIN SER-MCNC: 3.5 GM/DL (ref 2.4–3.5)
GLUCOSE SERPL-MCNC: 93 MG/DL (ref 82–115)
HCT VFR BLD AUTO: 39.1 % (ref 37–47)
HGB BLD-MCNC: 13 G/DL (ref 12–16)
IGA SERPL-MCNC: 189 MG/DL (ref 69–517)
IGG SERPL-MCNC: 888 MG/DL (ref 522–1631)
IGM SERPL-MCNC: 10 MG/DL (ref 33–293)
IMM GRANULOCYTES # BLD AUTO: 0 X10(3)/MCL (ref 0–0.04)
IMM GRANULOCYTES NFR BLD AUTO: 0 %
LYMPHOCYTES # BLD AUTO: 2.07 X10(3)/MCL (ref 0.6–4.6)
LYMPHOCYTES NFR BLD AUTO: 49.6 %
MCH RBC QN AUTO: 32.6 PG (ref 27–31)
MCHC RBC AUTO-ENTMCNC: 33.2 G/DL (ref 33–36)
MCV RBC AUTO: 98 FL (ref 80–94)
MONOCYTES # BLD AUTO: 0.33 X10(3)/MCL (ref 0.1–1.3)
MONOCYTES NFR BLD AUTO: 7.9 %
NEUTROPHILS # BLD AUTO: 1.52 X10(3)/MCL (ref 2.1–9.2)
NEUTROPHILS NFR BLD AUTO: 36.5 %
PLATELET # BLD AUTO: 272 X10(3)/MCL (ref 130–400)
PMV BLD AUTO: 9 FL (ref 7.4–10.4)
POTASSIUM SERPL-SCNC: 3.9 MMOL/L (ref 3.5–5.1)
PROT SERPL-MCNC: 7.6 GM/DL (ref 5.8–7.6)
RBC # BLD AUTO: 3.99 X10(6)/MCL (ref 4.2–5.4)
SODIUM SERPL-SCNC: 143 MMOL/L (ref 136–145)
WBC # BLD AUTO: 4.17 X10(3)/MCL (ref 4.5–11.5)

## 2025-06-10 PROCEDURE — 80053 COMPREHEN METABOLIC PANEL: CPT

## 2025-06-10 PROCEDURE — 83521 IG LIGHT CHAINS FREE EACH: CPT | Mod: 91

## 2025-06-10 PROCEDURE — 85025 COMPLETE CBC W/AUTO DIFF WBC: CPT

## 2025-06-10 PROCEDURE — 36415 COLL VENOUS BLD VENIPUNCTURE: CPT

## 2025-06-10 PROCEDURE — 86334 IMMUNOFIX E-PHORESIS SERUM: CPT

## 2025-06-10 PROCEDURE — 82784 ASSAY IGA/IGD/IGG/IGM EACH: CPT

## 2025-06-11 LAB
ALBUMIN % SPEP (OHS): 53.62 (ref 48.1–59.5)
ALBUMIN SERPL-MCNC: 3.8 G/DL (ref 3.4–4.8)
ALBUMIN/GLOB SERPL: 1.2 RATIO (ref 1.1–2)
ALPHA 1 GLOB (OHS): 0.29 GM/DL (ref 0–0.4)
ALPHA 1 GLOB% (OHS): 4.13 (ref 2.3–4.9)
ALPHA 2 GLOB % (OHS): 10.95 (ref 6.9–13)
ALPHA 2 GLOB (OHS): 0.77 GM/DL (ref 0.4–1)
BETA GLOB (OHS): 1.25 GM/DL (ref 0.7–1.3)
BETA GLOB% (OHS): 17.85 (ref 13.8–19.7)
GAMMA GLOBULIN % (OHS): 13.45 (ref 10.1–21.9)
GAMMA GLOBULIN (OHS): 0.94 GM/DL (ref 0.4–1.8)
GLOBULIN SER-MCNC: 3.2 GM/DL (ref 2.4–3.5)
KAPPA LC FREE SER NEPH-MCNC: 1.48 MG/DL (ref 0.33–1.94)
KAPPA LC FREE SER NEPH-MCNC: 1.5 MG/DL (ref 0.33–1.94)
KAPPA LC FREE/LAMBDA FREE SER NEPH: 0.01 {RATIO} (ref 0.26–1.65)
KAPPA LC FREE/LAMBDA FREE SER NEPH: 0.01 {RATIO} (ref 0.26–1.65)
LAMBDA LC FREE SERPL-MCNC: 165 MG/DL (ref 0.57–2.63)
LAMBDA LC FREE SERPL-MCNC: 165 MG/DL (ref 0.57–2.63)
M SPIKE % (OHS): 2.59
M SPIKE (OHS): 0.18 GM/DL
PATH REV: NORMAL
PROT SERPL-MCNC: 7 GM/DL (ref 5.8–7.6)

## 2025-06-19 ENCOUNTER — OFFICE VISIT (OUTPATIENT)
Dept: HEMATOLOGY/ONCOLOGY | Facility: CLINIC | Age: 85
End: 2025-06-19
Payer: MEDICARE

## 2025-06-19 VITALS
DIASTOLIC BLOOD PRESSURE: 71 MMHG | BODY MASS INDEX: 25.99 KG/M2 | OXYGEN SATURATION: 99 % | WEIGHT: 132.38 LBS | TEMPERATURE: 98 F | RESPIRATION RATE: 18 BRPM | SYSTOLIC BLOOD PRESSURE: 135 MMHG | HEIGHT: 60 IN | HEART RATE: 66 BPM

## 2025-06-19 DIAGNOSIS — D47.2 SMOLDERING MULTIPLE MYELOMA: Primary | ICD-10-CM

## 2025-06-19 DIAGNOSIS — C90.00 MULTIPLE MYELOMA, REMISSION STATUS UNSPECIFIED: ICD-10-CM

## 2025-06-19 PROCEDURE — 1159F MED LIST DOCD IN RCRD: CPT | Mod: CPTII,S$GLB,,

## 2025-06-19 PROCEDURE — 99214 OFFICE O/P EST MOD 30 MIN: CPT | Mod: S$GLB,,,

## 2025-06-19 PROCEDURE — 1126F AMNT PAIN NOTED NONE PRSNT: CPT | Mod: CPTII,S$GLB,,

## 2025-06-19 PROCEDURE — 3078F DIAST BP <80 MM HG: CPT | Mod: CPTII,S$GLB,,

## 2025-06-19 PROCEDURE — 3075F SYST BP GE 130 - 139MM HG: CPT | Mod: CPTII,S$GLB,,

## 2025-06-19 PROCEDURE — 99999 PR PBB SHADOW E&M-EST. PATIENT-LVL III: CPT | Mod: PBBFAC,,,

## 2025-06-19 PROCEDURE — 1160F RVW MEDS BY RX/DR IN RCRD: CPT | Mod: CPTII,S$GLB,,

## 2025-06-19 NOTE — PROGRESS NOTES
Subjective:       Patient ID: Anna Marie Campos is a 85 y.o. female.    Chief Complaint: Follow Up    Diagnosis: Multiple Myeloma - Smoldering    Current Treatment: 1. Active Observation    Treatment History: N/A    HPI:   86yo who was found by PCP in late August to have an CHRISS with UTI, hyperkalemia, and progressive anemia. Therapy and further workup lead to SPEP with IgA M-spike of 0.12 g/dl with lambda light chain restriction. Lab review with a normocytic anemia with Hgb/Hct of 9.6/28.9 and thrombocythemia of 417K. Renal function stable, no evidence of hyperCa. Of note, patients abnormal CBC with Macrocytic anemia and thrombocythemia is new since August '22, as her values were normal in May CBC prior (except for macrocytosis but no anemia). A workup was initiated to evaluate for multiple myeloma - 9/2022 October 2022 Bone Scan with no lytic lesions. Plans to repeat; however patient did not show for her appt. She has been lost of follow up since 2/2024. She still has no evidence of CRAB criteria based off CBC, CMP, and myeloma lab are currently stable without role for further intervention. Will continue to monitor.     Interval History  Patient here today for NP follow up appointment and labs to discuss results and treatment plan.   Overall she states she feels well.   Today, she have no complaints  Reports chronic knee pain; not worsening and ambulates with cane  Denies any recurrent infections, hospitalizations, weight loss, NS or new pains.   Continues to cook. Appetite and Energy is stable.   Discussed labs in detail with patient and family  Otherwise no major issues or concerns      Past Medical History:   Diagnosis Date    Anemia     Hyperlipidemia     Hypertension     OAB (overactive bladder)     Osteoarthritis of both knees     Osteopenia       Past Surgical History:   Procedure Laterality Date    BONE MARROW ASPIRATION N/A 9/28/2022    Procedure: ASPIRATION, BONE MARROW;  Surgeon: Mathieu Pastrana MD;   Location: [facility] OR;  Service: General;  Laterality: N/A;    BREAST BIOPSY  05/01/2019    COLONOSCOPY       Social History     Socioeconomic History    Marital status:    Tobacco Use    Smoking status: Never    Smokeless tobacco: Never   Substance and Sexual Activity    Alcohol use: Yes     Alcohol/week: 1.0 standard drink of alcohol     Types: 1 Shots of liquor per week     Comment: occassionally    Drug use: Never    Sexual activity: Not Currently     Social Drivers of Health     Financial Resource Strain: Low Risk  (6/20/2023)    Overall Financial Resource Strain (CARDIA)     Difficulty of Paying Living Expenses: Not hard at all   Food Insecurity: No Food Insecurity (6/20/2023)    Hunger Vital Sign     Worried About Running Out of Food in the Last Year: Never true     Ran Out of Food in the Last Year: Never true   Transportation Needs: No Transportation Needs (6/20/2023)    PRAPARE - Transportation     Lack of Transportation (Medical): No     Lack of Transportation (Non-Medical): No   Physical Activity: Insufficiently Active (6/20/2023)    Exercise Vital Sign     Days of Exercise per Week: 3 days     Minutes of Exercise per Session: 40 min   Stress: No Stress Concern Present (6/20/2023)    [employer] [place] [employer] - Occupational Stress Questionnaire     Feeling of Stress : Not at all   Housing Stability: Low Risk  (2/7/2024)    Housing Stability Vital Sign     Unable to Pay for Housing in the Last Year: No     Number of Places Lived in the Last Year: 1     Unstable Housing in the Last Year: No      Family History   Problem Relation Name Age of Onset    Hypertension Mother      Hypertension Father      Stroke Father      Breast cancer Sister      Anemia Sister      Lung cancer Brother      Bone cancer Brother      Diabetes Daughter      Diabetes Son        Review of patient's allergies indicates:   Allergen Reactions    Codeine      Other reaction(s): Unknown    Hydrocodone-acetaminophen  Nausea Only      Review of Systems   Constitutional:  Negative for activity change, appetite change, fatigue and fever.   HENT:  Negative for sore throat.    Eyes:  Negative for visual disturbance.   Respiratory:  Negative for shortness of breath.    Cardiovascular:  Negative for chest pain.   Gastrointestinal:  Negative for abdominal distention and abdominal pain.   Endocrine: Negative for polydipsia.   Genitourinary:  Negative for dysuria and hot flashes.   Musculoskeletal:  Negative for arthralgias and back pain.   Integumentary:  Negative for rash.   Neurological:  Negative for dizziness and light-headedness.   Hematological:  Does not bruise/bleed easily.   Psychiatric/Behavioral:  Negative for confusion.          Objective:      Vitals:    06/19/25 0936   BP: 135/71   Pulse: 66   Resp: 18   Temp: 97.9 °F (36.6 °C)     Physical Exam  Constitutional:       Appearance: Normal appearance. She is normal weight.   HENT:      Head: Normocephalic and atraumatic.      Nose: Nose normal.      Mouth/Throat:      Mouth: Mucous membranes are moist.      Pharynx: Oropharynx is clear.   Eyes:      Extraocular Movements: Extraocular movements intact.      Conjunctiva/sclera: Conjunctivae normal.      Pupils: Pupils are equal, round, and reactive to light.   Cardiovascular:      Rate and Rhythm: Normal rate and regular rhythm.      Pulses: Normal pulses.      Heart sounds: Normal heart sounds.   Pulmonary:      Effort: Pulmonary effort is normal.      Breath sounds: Normal breath sounds.   Abdominal:      General: There is no distension.      Palpations: Abdomen is soft.      Tenderness: There is no abdominal tenderness.   Musculoskeletal:         General: No tenderness or deformity.      Cervical back: Normal range of motion and neck supple.      Right lower leg: No edema.      Left lower leg: No edema.   Lymphadenopathy:      Cervical: No cervical adenopathy.   Skin:     General: Skin is warm and dry.   Neurological:       General: No focal deficit present.      Mental Status: She is alert.         LABS AND IMAGING REVIEWED IN EPIC      Component      Latest Ref Rng 2/22/2024 6/10/2025   [place] Free Light Chain      0.3300 - 1.94 mg/dL 1.23  1.48    [place] Free Light Chain      0.3300 - 1.94 mg/dL  1.50    [place]AMB[place] FREE LIGHT CHAIN      0.5700 - 2.63 mg/dL 194 (H)  165 (H)    [place]DA FREE LIGHT CHAIN      0.5700 - 2.63 mg/dL  165 (H)    Kappa/Lambda FLC Ratio      0.2600 - 1.65  0.0063 (L)  0.0090 (L)    Kappa/Lambda FLC Ratio      0.2600 - 1.65   0.0091 (L)       Legend:  (H) High  (L) Low    Component      Latest Ref Rng 2/22/2024 6/10/2025   IgG      522.00 - 1,631.00 mg/dL 1,033.00  888.00    IgA Level      69.0 - 517.0 mg/dL 258.0  189.0    IgM      33.0 - 293.0 mg/dL 14.0 (L)  10.0 (L)       Legend:  (L) Low    Pathology review    SPEP: A M-spike is detected in the beta region (0.18 g/dL).    EARL: A band is present in the IgA cynthia with a corresponding band in the lambda cynthia.    The immunofixation electrophoresis are consistent with monoclonal gammopathy of IgA-lambda isotype.         10/6/22 XRAY Bone Scan  Impression:  1. No discrete lytic skeletal lesion identified.  2. Extensive degenerative osteoarthritic changes described above.    9/28/22 Bone Marrow Biopsy  Final Diagnosis   BONE MARROW, RIGHT POSTERIOR ILIAC CREST, BIOPSY:     PLASMA CELL MYELOMA.     LAMBDA RESTRICTED PLASMA CELLS COMPRISE 20% OF MARROW NUCLEATED CELLULARITY.       DECREASED MARROW IRON STORES.       Assessment:   Light Chain Multiple Myeloma - Smoldering  She meets criteria for diagnosis based off bone marrow with 20% plasma cells as well as UPEP with >500 g M-spike. She has no evidence of CRAB criteria based off CBC, CMP, and 10/2022 skeletal survey.  She is currently under the smoldering myeloma category. 11/2022 Dexa with evidence of osteopenia. Most recent repeat skeletal survey and DEXA within the last year ordered and she did not show.  I will  continue to watch her closely with labs and symptom checks. I instructed her and her family on the types of general symptoms I want her to look out for and notify me if she begins to have them. If she eventually does progress and require therapy would consider single agent Daratuzumab given her age.       Plan:     - Continue active observation for her multiple myeloma at this time.  - Bone scan prior to next visit; ordered today  - RTC 3 months for MD visit, labs 1 week prior    I spent a total of 40 minutes on the day of the visit.This includes face to face time and non-face to face time preparing to see the patient (eg, review of tests), obtaining and/or reviewing separately obtained history, documenting clinical information in the electronic or other health record, independently interpreting results and communicating results to the patient/family/caregiver, or care coordinator.      JOSSELIN Glover-C  Hematology/Oncology  [facility]

## 2025-06-30 ENCOUNTER — HOSPITAL ENCOUNTER (OUTPATIENT)
Dept: RADIOLOGY | Facility: HOSPITAL | Age: 85
Discharge: HOME | End: 2025-06-30
Payer: MEDICARE

## 2025-06-30 DIAGNOSIS — D47.2 SMOLDERING MULTIPLE MYELOMA: ICD-10-CM

## 2025-06-30 PROCEDURE — A9503 TC99M MEDRONATE: HCPCS

## 2025-06-30 PROCEDURE — 78306 BONE IMAGING WHOLE BODY: CPT | Mod: TC

## 2025-06-30 RX ADMIN — TECHNETIUM TC 99M MEDRONATE 23.9 MILLICURIE: 20 INJECTION, POWDER, LYOPHILIZED, FOR SOLUTION INTRAVENOUS at 10:06
